# Patient Record
Sex: FEMALE | Race: BLACK OR AFRICAN AMERICAN | NOT HISPANIC OR LATINO | Employment: FULL TIME | URBAN - METROPOLITAN AREA
[De-identification: names, ages, dates, MRNs, and addresses within clinical notes are randomized per-mention and may not be internally consistent; named-entity substitution may affect disease eponyms.]

---

## 2024-04-02 ENCOUNTER — APPOINTMENT (OUTPATIENT)
Dept: GENERAL RADIOLOGY | Facility: HOSPITAL | Age: 44
End: 2024-04-02
Payer: COMMERCIAL

## 2024-04-02 ENCOUNTER — APPOINTMENT (OUTPATIENT)
Dept: CARDIOLOGY | Facility: HOSPITAL | Age: 44
End: 2024-04-02
Payer: COMMERCIAL

## 2024-04-02 ENCOUNTER — APPOINTMENT (OUTPATIENT)
Dept: CT IMAGING | Facility: HOSPITAL | Age: 44
End: 2024-04-02
Payer: COMMERCIAL

## 2024-04-02 ENCOUNTER — APPOINTMENT (OUTPATIENT)
Dept: MRI IMAGING | Facility: HOSPITAL | Age: 44
End: 2024-04-02
Payer: COMMERCIAL

## 2024-04-02 ENCOUNTER — HOSPITAL ENCOUNTER (OUTPATIENT)
Facility: HOSPITAL | Age: 44
Setting detail: OBSERVATION
Discharge: HOME HEALTH | End: 2024-04-04
Attending: EMERGENCY MEDICINE | Admitting: INTERNAL MEDICINE
Payer: COMMERCIAL

## 2024-04-02 DIAGNOSIS — G81.90 HEMIPLEGIA, UNSPECIFIED ETIOLOGY, UNSPECIFIED HEMIPLEGIA TYPE, UNSPECIFIED LATERALITY: Primary | ICD-10-CM

## 2024-04-02 DIAGNOSIS — R26.2 DIFFICULTY IN WALKING: ICD-10-CM

## 2024-04-02 DIAGNOSIS — Z78.9 DECREASED ACTIVITIES OF DAILY LIVING (ADL): ICD-10-CM

## 2024-04-02 DIAGNOSIS — R29.898 TRANSIENT WEAKNESS OF RIGHT LOWER EXTREMITY: ICD-10-CM

## 2024-04-02 DIAGNOSIS — R13.10 DYSPHAGIA, UNSPECIFIED TYPE: ICD-10-CM

## 2024-04-02 LAB
ABO GROUP BLD: NORMAL
ABO GROUP BLD: NORMAL
ALBUMIN SERPL-MCNC: 3.8 G/DL (ref 3.5–5.2)
ALBUMIN/GLOB SERPL: 1.2 G/DL
ALP SERPL-CCNC: 88 U/L (ref 39–117)
ALT SERPL W P-5'-P-CCNC: 11 U/L (ref 1–33)
ANION GAP SERPL CALCULATED.3IONS-SCNC: 10.3 MMOL/L (ref 5–15)
ANISOCYTOSIS BLD QL: NORMAL
APTT PPP: 27 SECONDS (ref 24.2–34.2)
AST SERPL-CCNC: 16 U/L (ref 1–32)
BACTERIA UR QL AUTO: ABNORMAL /HPF
BASOPHILS # BLD AUTO: 0.03 10*3/MM3 (ref 0–0.2)
BASOPHILS NFR BLD AUTO: 0.6 % (ref 0–1.5)
BILIRUB SERPL-MCNC: 0.2 MG/DL (ref 0–1.2)
BILIRUB UR QL STRIP: NEGATIVE
BLD GP AB SCN SERPL QL: NEGATIVE
BUN SERPL-MCNC: 10 MG/DL (ref 6–20)
BUN/CREAT SERPL: 10.2 (ref 7–25)
CALCIUM SPEC-SCNC: 8.5 MG/DL (ref 8.6–10.5)
CHLORIDE SERPL-SCNC: 104 MMOL/L (ref 98–107)
CLARITY UR: CLEAR
CO2 SERPL-SCNC: 23.7 MMOL/L (ref 22–29)
COLOR UR: YELLOW
CREAT SERPL-MCNC: 0.98 MG/DL (ref 0.57–1)
DACRYOCYTES BLD QL SMEAR: NORMAL
DEPRECATED RDW RBC AUTO: 42 FL (ref 37–54)
EGFRCR SERPLBLD CKD-EPI 2021: 72.7 ML/MIN/1.73
EOSINOPHIL # BLD AUTO: 0.11 10*3/MM3 (ref 0–0.4)
EOSINOPHIL NFR BLD AUTO: 2 % (ref 0.3–6.2)
ERYTHROCYTE [DISTWIDTH] IN BLOOD BY AUTOMATED COUNT: 17 % (ref 12.3–15.4)
GLOBULIN UR ELPH-MCNC: 3.3 GM/DL
GLUCOSE BLDC GLUCOMTR-MCNC: 78 MG/DL (ref 70–99)
GLUCOSE SERPL-MCNC: 100 MG/DL (ref 65–99)
GLUCOSE UR STRIP-MCNC: NEGATIVE MG/DL
HCT VFR BLD AUTO: 33.3 % (ref 34–46.6)
HGB BLD-MCNC: 10.1 G/DL (ref 12–15.9)
HGB UR QL STRIP.AUTO: ABNORMAL
HOLD SPECIMEN: NORMAL
HOLD SPECIMEN: NORMAL
HYALINE CASTS UR QL AUTO: ABNORMAL /LPF
HYPOCHROMIA BLD QL: NORMAL
IMM GRANULOCYTES # BLD AUTO: 0.02 10*3/MM3 (ref 0–0.05)
IMM GRANULOCYTES NFR BLD AUTO: 0.4 % (ref 0–0.5)
INR PPP: 1 (ref 0.86–1.15)
KETONES UR QL STRIP: NEGATIVE
LEUKOCYTE ESTERASE UR QL STRIP.AUTO: ABNORMAL
LYMPHOCYTES # BLD AUTO: 1.32 10*3/MM3 (ref 0.7–3.1)
LYMPHOCYTES NFR BLD AUTO: 24.4 % (ref 19.6–45.3)
MCH RBC QN AUTO: 21.2 PG (ref 26.6–33)
MCHC RBC AUTO-ENTMCNC: 30.3 G/DL (ref 31.5–35.7)
MCV RBC AUTO: 70 FL (ref 79–97)
MICROCYTES BLD QL: NORMAL
MONOCYTES # BLD AUTO: 0.56 10*3/MM3 (ref 0.1–0.9)
MONOCYTES NFR BLD AUTO: 10.4 % (ref 5–12)
NEUTROPHILS NFR BLD AUTO: 3.36 10*3/MM3 (ref 1.7–7)
NEUTROPHILS NFR BLD AUTO: 62.2 % (ref 42.7–76)
NITRITE UR QL STRIP: NEGATIVE
NRBC BLD AUTO-RTO: 0 /100 WBC (ref 0–0.2)
OVALOCYTES BLD QL SMEAR: NORMAL
PH UR STRIP.AUTO: 7.5 [PH] (ref 5–8)
PLATELET # BLD AUTO: 337 10*3/MM3 (ref 140–450)
PMV BLD AUTO: 10.7 FL (ref 6–12)
POIKILOCYTOSIS BLD QL SMEAR: NORMAL
POLYCHROMASIA BLD QL SMEAR: NORMAL
POTASSIUM SERPL-SCNC: 3.8 MMOL/L (ref 3.5–5.2)
PROT SERPL-MCNC: 7.1 G/DL (ref 6–8.5)
PROT UR QL STRIP: NEGATIVE
PROTHROMBIN TIME: 13.4 SECONDS (ref 11.8–14.9)
RBC # BLD AUTO: 4.76 10*6/MM3 (ref 3.77–5.28)
RBC # UR STRIP: ABNORMAL /HPF
REF LAB TEST METHOD: ABNORMAL
RH BLD: POSITIVE
RH BLD: POSITIVE
SMALL PLATELETS BLD QL SMEAR: ADEQUATE
SODIUM SERPL-SCNC: 138 MMOL/L (ref 136–145)
SP GR UR STRIP: 1.02 (ref 1–1.03)
SQUAMOUS #/AREA URNS HPF: ABNORMAL /HPF
T&S EXPIRATION DATE: NORMAL
TROPONIN T SERPL HS-MCNC: <6 NG/L
UROBILINOGEN UR QL STRIP: ABNORMAL
WBC # UR STRIP: ABNORMAL /HPF
WBC MORPH BLD: NORMAL
WBC NRBC COR # BLD AUTO: 5.4 10*3/MM3 (ref 3.4–10.8)
WHOLE BLOOD HOLD COAG: NORMAL
WHOLE BLOOD HOLD SPECIMEN: NORMAL

## 2024-04-02 PROCEDURE — 84484 ASSAY OF TROPONIN QUANT: CPT | Performed by: EMERGENCY MEDICINE

## 2024-04-02 PROCEDURE — 80053 COMPREHEN METABOLIC PANEL: CPT | Performed by: EMERGENCY MEDICINE

## 2024-04-02 PROCEDURE — 86850 RBC ANTIBODY SCREEN: CPT | Performed by: EMERGENCY MEDICINE

## 2024-04-02 PROCEDURE — 71045 X-RAY EXAM CHEST 1 VIEW: CPT

## 2024-04-02 PROCEDURE — 93010 ELECTROCARDIOGRAM REPORT: CPT | Performed by: INTERNAL MEDICINE

## 2024-04-02 PROCEDURE — 25510000001 IOPAMIDOL PER 1 ML: Performed by: EMERGENCY MEDICINE

## 2024-04-02 PROCEDURE — 96374 THER/PROPH/DIAG INJ IV PUSH: CPT

## 2024-04-02 PROCEDURE — G0378 HOSPITAL OBSERVATION PER HR: HCPCS

## 2024-04-02 PROCEDURE — 81001 URINALYSIS AUTO W/SCOPE: CPT | Performed by: EMERGENCY MEDICINE

## 2024-04-02 PROCEDURE — 70551 MRI BRAIN STEM W/O DYE: CPT

## 2024-04-02 PROCEDURE — 36415 COLL VENOUS BLD VENIPUNCTURE: CPT

## 2024-04-02 PROCEDURE — 99204 OFFICE O/P NEW MOD 45 MIN: CPT | Performed by: PSYCHIATRY & NEUROLOGY

## 2024-04-02 PROCEDURE — 86901 BLOOD TYPING SEROLOGIC RH(D): CPT

## 2024-04-02 PROCEDURE — 25010000002 KETOROLAC TROMETHAMINE PER 15 MG: Performed by: EMERGENCY MEDICINE

## 2024-04-02 PROCEDURE — 86900 BLOOD TYPING SEROLOGIC ABO: CPT

## 2024-04-02 PROCEDURE — 93005 ELECTROCARDIOGRAM TRACING: CPT | Performed by: EMERGENCY MEDICINE

## 2024-04-02 PROCEDURE — 25010000002 METOCLOPRAMIDE PER 10 MG: Performed by: EMERGENCY MEDICINE

## 2024-04-02 PROCEDURE — 99285 EMERGENCY DEPT VISIT HI MDM: CPT

## 2024-04-02 PROCEDURE — 85730 THROMBOPLASTIN TIME PARTIAL: CPT | Performed by: EMERGENCY MEDICINE

## 2024-04-02 PROCEDURE — 86900 BLOOD TYPING SEROLOGIC ABO: CPT | Performed by: EMERGENCY MEDICINE

## 2024-04-02 PROCEDURE — 70450 CT HEAD/BRAIN W/O DYE: CPT

## 2024-04-02 PROCEDURE — 70496 CT ANGIOGRAPHY HEAD: CPT

## 2024-04-02 PROCEDURE — 0042T HC CT CEREBRAL PERFUSION W/WO CONTRAST: CPT

## 2024-04-02 PROCEDURE — 25010000002 DIPHENHYDRAMINE PER 50 MG: Performed by: EMERGENCY MEDICINE

## 2024-04-02 PROCEDURE — 85025 COMPLETE CBC W/AUTO DIFF WBC: CPT | Performed by: EMERGENCY MEDICINE

## 2024-04-02 PROCEDURE — 93306 TTE W/DOPPLER COMPLETE: CPT | Performed by: INTERNAL MEDICINE

## 2024-04-02 PROCEDURE — 86901 BLOOD TYPING SEROLOGIC RH(D): CPT | Performed by: EMERGENCY MEDICINE

## 2024-04-02 PROCEDURE — 93306 TTE W/DOPPLER COMPLETE: CPT

## 2024-04-02 PROCEDURE — 82948 REAGENT STRIP/BLOOD GLUCOSE: CPT

## 2024-04-02 PROCEDURE — 85610 PROTHROMBIN TIME: CPT | Performed by: EMERGENCY MEDICINE

## 2024-04-02 PROCEDURE — 96375 TX/PRO/DX INJ NEW DRUG ADDON: CPT

## 2024-04-02 PROCEDURE — 25010000002 LORAZEPAM PER 2 MG: Performed by: STUDENT IN AN ORGANIZED HEALTH CARE EDUCATION/TRAINING PROGRAM

## 2024-04-02 PROCEDURE — 85007 BL SMEAR W/DIFF WBC COUNT: CPT | Performed by: EMERGENCY MEDICINE

## 2024-04-02 PROCEDURE — 70498 CT ANGIOGRAPHY NECK: CPT

## 2024-04-02 RX ORDER — SODIUM CHLORIDE 0.9 % (FLUSH) 0.9 %
10 SYRINGE (ML) INJECTION AS NEEDED
Status: DISCONTINUED | OUTPATIENT
Start: 2024-04-02 | End: 2024-04-04 | Stop reason: HOSPADM

## 2024-04-02 RX ORDER — ATORVASTATIN CALCIUM 40 MG/1
80 TABLET, FILM COATED ORAL NIGHTLY
Status: DISCONTINUED | OUTPATIENT
Start: 2024-04-02 | End: 2024-04-04 | Stop reason: HOSPADM

## 2024-04-02 RX ORDER — ACETAMINOPHEN 650 MG/1
650 SUPPOSITORY RECTAL EVERY 4 HOURS PRN
Status: DISCONTINUED | OUTPATIENT
Start: 2024-04-02 | End: 2024-04-04 | Stop reason: HOSPADM

## 2024-04-02 RX ORDER — ASPIRIN 81 MG/1
81 TABLET, CHEWABLE ORAL DAILY
Status: DISCONTINUED | OUTPATIENT
Start: 2024-04-02 | End: 2024-04-04 | Stop reason: HOSPADM

## 2024-04-02 RX ORDER — ONDANSETRON 2 MG/ML
4 INJECTION INTRAMUSCULAR; INTRAVENOUS EVERY 6 HOURS PRN
Status: DISCONTINUED | OUTPATIENT
Start: 2024-04-02 | End: 2024-04-04 | Stop reason: HOSPADM

## 2024-04-02 RX ORDER — SODIUM CHLORIDE 0.9 % (FLUSH) 0.9 %
10 SYRINGE (ML) INJECTION AS NEEDED
Status: DISCONTINUED | OUTPATIENT
Start: 2024-04-02 | End: 2024-04-03

## 2024-04-02 RX ORDER — METOCLOPRAMIDE HYDROCHLORIDE 5 MG/ML
10 INJECTION INTRAMUSCULAR; INTRAVENOUS ONCE
Status: COMPLETED | OUTPATIENT
Start: 2024-04-02 | End: 2024-04-02

## 2024-04-02 RX ORDER — LORAZEPAM 2 MG/ML
1 INJECTION INTRAMUSCULAR ONCE AS NEEDED
Status: COMPLETED | OUTPATIENT
Start: 2024-04-02 | End: 2024-04-02

## 2024-04-02 RX ORDER — SODIUM CHLORIDE 0.9 % (FLUSH) 0.9 %
10 SYRINGE (ML) INJECTION EVERY 12 HOURS SCHEDULED
Status: DISCONTINUED | OUTPATIENT
Start: 2024-04-02 | End: 2024-04-04 | Stop reason: HOSPADM

## 2024-04-02 RX ORDER — ASPIRIN 300 MG/1
300 SUPPOSITORY RECTAL DAILY
Status: DISCONTINUED | OUTPATIENT
Start: 2024-04-02 | End: 2024-04-04 | Stop reason: HOSPADM

## 2024-04-02 RX ORDER — SODIUM CHLORIDE 9 MG/ML
40 INJECTION, SOLUTION INTRAVENOUS AS NEEDED
Status: DISCONTINUED | OUTPATIENT
Start: 2024-04-02 | End: 2024-04-04 | Stop reason: HOSPADM

## 2024-04-02 RX ORDER — DIPHENHYDRAMINE HYDROCHLORIDE 50 MG/ML
25 INJECTION INTRAMUSCULAR; INTRAVENOUS ONCE
Status: COMPLETED | OUTPATIENT
Start: 2024-04-02 | End: 2024-04-02

## 2024-04-02 RX ORDER — ACETAMINOPHEN 325 MG/1
650 TABLET ORAL EVERY 4 HOURS PRN
Status: DISCONTINUED | OUTPATIENT
Start: 2024-04-02 | End: 2024-04-04 | Stop reason: HOSPADM

## 2024-04-02 RX ORDER — KETOROLAC TROMETHAMINE 30 MG/ML
30 INJECTION, SOLUTION INTRAMUSCULAR; INTRAVENOUS ONCE
Status: COMPLETED | OUTPATIENT
Start: 2024-04-02 | End: 2024-04-02

## 2024-04-02 RX ORDER — DIPHENOXYLATE HYDROCHLORIDE AND ATROPINE SULFATE 2.5; .025 MG/1; MG/1
1 TABLET ORAL DAILY
COMMUNITY

## 2024-04-02 RX ADMIN — LORAZEPAM 1 MG: 2 INJECTION INTRAMUSCULAR; INTRAVENOUS at 20:45

## 2024-04-02 RX ADMIN — Medication 10 ML: at 21:47

## 2024-04-02 RX ADMIN — KETOROLAC TROMETHAMINE 30 MG: 30 INJECTION, SOLUTION INTRAMUSCULAR; INTRAVENOUS at 12:22

## 2024-04-02 RX ADMIN — DIPHENHYDRAMINE HYDROCHLORIDE 25 MG: 50 INJECTION, SOLUTION INTRAMUSCULAR; INTRAVENOUS at 12:22

## 2024-04-02 RX ADMIN — ATORVASTATIN CALCIUM 80 MG: 40 TABLET, FILM COATED ORAL at 21:47

## 2024-04-02 RX ADMIN — METOCLOPRAMIDE HYDROCHLORIDE 10 MG: 5 INJECTION INTRAMUSCULAR; INTRAVENOUS at 12:22

## 2024-04-02 RX ADMIN — IOPAMIDOL 100 ML: 755 INJECTION, SOLUTION INTRAVENOUS at 09:35

## 2024-04-02 RX ADMIN — Medication 10 ML: at 13:45

## 2024-04-02 RX ADMIN — IOPAMIDOL 80 ML: 755 INJECTION, SOLUTION INTRAVENOUS at 09:24

## 2024-04-02 RX ADMIN — ASPIRIN 81 MG: 81 TABLET, CHEWABLE ORAL at 13:45

## 2024-04-02 NOTE — ED NOTES
Patient arrived via triage with reports of right sided weakness, numbness, tingling and right sided facial droop. Patient reports LKW 1030 yesterday night, and woke up at 0730 this AM with sx. Denies thinners.

## 2024-04-02 NOTE — CONSULTS
[facility]  [facility]      Patient Name:   Ro Rai  YOB: 1980  Identification Number:   MRN - [id number]  Date of Service:   04/02/2024 09:02:51    Diagnosis:        R51.9 - Headache, unspecified        R20.2 - Paresthesia of skin    Impression:       Pt with a h/o migraine, functional neurologic disorder, nonepileptic spells (PNES) presents with awakening with right sided weakness and numbness. Exam is fluctuating and slow, at times resisting the movements intended during the exam and nonrhythmic left sided shaking noted. Functional neurologic disorder suspected, however this is a diagnosis of exclusion.   Recommend MRI brain w/o, routine EEG for clarification. ASA 81 mg in interim.    Our recommendations are outlined below.    Recommendations:          Stroke/Telemetry Floor        Neuro Checks        Bedside Swallow Eval        DVT Prophylaxis        Euglycemia and Avoid Hyperthermia (PRN Acetaminophen)        Initiate or continue Aspirin 81 MG daily    Recommended Scan:       MRI Head Without Contrast   (CT head negative stroke still suspected)    Lipid Panel to Be Obtained, if Not Done in the Last 30 Days    Therapies:        Physical Therapy, Occupational Therapy, Speech Therapy Assessment When Applicable    Dysphagia:        Swallow Evaluation, Bedside        NPO Until Swallow Evaluation    DVT prophylaxis:        Choice of Primary Team    Disposition:        Neurology Follow Up Recommended    Sign Out:        Discussed with Emergency Department Provider        ------------------------------------------------------------------------------    Advanced Imaging:  CTA Head and Neck Completed.    CTP Completed.    LVO:No    Patient in not a candidate for VIRGEN      Metrics:  Last Known Well: 04/01/2024 22:30:00  [facility] Notification Time: 04/02/2024 09:01:52  Arrival Time: 04/02/2024 08:56:00  Stamp Time: 04/02/2024 09:02:51  Initial  Response Time: 04/02/2024 09:07:20  Symptoms: right sided weakness and numbness.  Initial patient interaction: 04/02/2024 09:09:17  NIHSS Assessment Completed: 04/02/2024 09:14:38  Patient is not a candidate for Thrombolytic.  Thrombolytic Medical Decision: 04/02/2024 09:14:41  Patient was not deemed candidate for Thrombolytic because of following reasons:  Last Well Known Above 4.5 Hours.    I personally Reviewed the CT Head and it Showed no acute process    Primary Provider Notified of Diagnostic Impression and Management Plan on: 04/02/2024 09:44:50        ------------------------------------------------------------------------------    History of Present Illness:  Patient is a 43 year old Female.    Patient was brought by private transportation with symptoms of right sided weakness and numbness.  The patient awoke with right sided weakness and numbness, she has a headache as well with photophobia and phonophobia. She has some nonrhythmic shaking of left upper extremity.  She denies recent stressors.  She has a h/o migraine, nonepileptic spells/conversion disorder manifesting as weakness and stuttering previously (per neurology records 3/12/2015)       Past Medical History:       There is no history of Diabetes Mellitus       There is no history of Atrial Fibrillation       There is no history of Stroke    Medications:    No Anticoagulant use   No Antiplatelet use  Reviewed EMR for current medications    Allergies:   Reviewed    Social History:  Smoking: No    Family History:    There is no family history of premature cerebrovascular disease pertinent to this consultation    ROS :  14 Points Review of Systems was performed and was negative except mentioned in HPI.    Past Surgical History:  There Is No Surgical History Contributory To Today’s Visit         Examination:  BP(158/81), Pulse(89), Blood Glucose(78)  1A: Level of Consciousness - Alert; keenly responsive + 0  1B: Ask Month and Age - Both Questions Right  + 0  1C: Blink Eyes & Squeeze Hands - Performs Both Tasks + 0  2: Test Horizontal Extraocular Movements - Normal + 0  3: Test Visual Fields - No Visual Loss + 0  4: Test Facial Palsy (Use Grimace if Obtunded) - Normal symmetry + 0  5A: Test Left Arm Motor Drift - No Drift for 10 Seconds + 0  5B: Test Right Arm Motor Drift - Drift, hits bed + 2  6A: Test Left Leg Motor Drift - No Drift for 5 Seconds + 0  6B: Test Right Leg Motor Drift - Drift, hits bed + 2  7: Test Limb Ataxia (FNF/Heel-Shin) - No Ataxia + 0  8: Test Sensation - Mild-Moderate Loss: Can Sense Being Touched + 1  9: Test Language/Aphasia - Normal; No aphasia + 0  10: Test Dysarthria - Normal + 0  11: Test Extinction/Inattention - No abnormality + 0    NIHSS Score: 5  NIHSS Free Text : slow, fluctuating exam, resisting some portions of exam activity   nonrhythmic left hand shaking intermittently    Pre-Morbid Modified [place] Scale:  0 Points = No symptoms at all    Spoke with : Dr. Duenas  I reviewed the available imaging via [facility] and initiated discussion with the primary provider    Patient/Family was informed the Neurology Consult would occur via TeleHealth consult by way of interactive audio and video telecommunications and consented to receiving care in this manner.      Patient is being evaluated for possible acute neurologic impairment and high probability of imminent or life-threatening deterioration. I spent total of 46 minutes providing care to this patient, including time for face to face visit via telemedicine, review of medical records, imaging studies and discussion of findings with providers, the patient and/or family.      Dr Vonnie Dawkins      [facility]  For Inpatient follow-up with [facility] physician please call [facility] [phone]. This is not an outpatient service. Post hospital discharge, please contact hospital directly.    Please do not communicate with [facility] physicians via secure chat. If you have any  questions, Please contact [facility].  Please call or reconsult our service if there are any clinical or diagnostic changes.

## 2024-04-02 NOTE — ED PROVIDER NOTES
Time: 10:49 AM EDT  Date of encounter:  4/2/2024  Independent Historian/Clinical History and Information was obtained by:   Patient and Family    History is limited by: N/A    Chief Complaint: Right-sided numbness and weakness      History of Present Illness:  Patient is a 43 y.o. year old female who presents to the emergency department for evaluation of right-sided numbness and weakness.  This patient was last known well at 1030 last night.  She presents today with right-sided numbness and weakness and also some difficulty speaking at times.    HPI    Patient Care Team  Primary Care Provider: Provider, Shruti Known    Past Medical History:     Allergies   Allergen Reactions    Sulfa Antibiotics Hives     History reviewed. No pertinent past medical history.  History reviewed. No pertinent surgical history.  History reviewed. No pertinent family history.    Home Medications:  Prior to Admission medications    Medication Sig Start Date End Date Taking? Authorizing Provider   COLLAGEN PO Take 1 tablet by mouth Daily.   Yes Carlos Osorio MD   CRANBERRY PO Take 1 tablet by mouth Daily.   Yes Carlos Osorio MD   Cyanocobalamin (B-12 PO) Take 1 tablet by mouth Daily.   Yes Carlos Osorio MD   multivitamin (MULTIVITAMIN PO) Take 1 tablet by mouth Daily.   Yes Carlos Osorio MD   multivitamin with minerals (HAIR SKIN & NAILS ADVANCED PO) Take 1 tablet by mouth Daily.   Yes Carlos Osorio MD   VITAMIN D PO Take 1 tablet by mouth Daily.   Yes Carlos Osorio MD        Social History:   Social History     Tobacco Use    Smoking status: Never   Vaping Use    Vaping status: Never Used   Substance Use Topics    Alcohol use: Yes     Alcohol/week: 1.0 standard drink of alcohol     Types: 1 Drinks containing 0.5 oz of alcohol per week    Drug use: Never         Review of Systems:  Review of Systems   Constitutional:  Negative for chills and fever.   HENT:  Negative for congestion, ear pain and  "sore throat.    Eyes:  Negative for pain.   Respiratory:  Negative for cough, chest tightness and shortness of breath.    Cardiovascular:  Negative for chest pain.   Gastrointestinal:  Negative for abdominal pain, diarrhea, nausea and vomiting.   Genitourinary:  Negative for flank pain and hematuria.   Musculoskeletal:  Negative for joint swelling.   Skin:  Negative for pallor.   Neurological:  Positive for speech difficulty, weakness, light-headedness and numbness. Negative for seizures and headaches.   All other systems reviewed and are negative.       Physical Exam:  /90 (BP Location: Right arm, Patient Position: Lying)   Pulse 59   Temp 98.6 °F (37 °C) (Oral)   Resp 16   Ht 165.1 cm (65\")   Wt 90.4 kg (199 lb 4.7 oz)   LMP  (LMP Unknown)   SpO2 100%   BMI 33.16 kg/m²     Physical Exam  Vitals and nursing note reviewed.   Constitutional:       General: She is not in acute distress.     Appearance: Normal appearance. She is not toxic-appearing.   HENT:      Head: Normocephalic and atraumatic.      Mouth/Throat:      Mouth: Mucous membranes are moist.   Eyes:      General: No scleral icterus.  Cardiovascular:      Rate and Rhythm: Normal rate and regular rhythm.      Pulses: Normal pulses.      Heart sounds: Normal heart sounds.   Pulmonary:      Effort: Pulmonary effort is normal. No respiratory distress.      Breath sounds: Normal breath sounds.   Abdominal:      General: Abdomen is flat.      Palpations: Abdomen is soft.      Tenderness: There is no abdominal tenderness.   Musculoskeletal:         General: Normal range of motion.      Cervical back: Normal range of motion and neck supple.   Skin:     General: Skin is warm and dry.      Capillary Refill: Capillary refill takes less than 2 seconds.   Neurological:      General: No focal deficit present.      Mental Status: She is alert and oriented to person, place, and time. Mental status is at baseline.      Sensory: Sensory deficit present.      " Motor: Weakness present.      Comments: NIH stroke score 5   Psychiatric:         Mood and Affect: Mood normal.         Behavior: Behavior normal.         Thought Content: Thought content normal.         Judgment: Judgment normal.                  Procedures:  Procedures      Medical Decision Making:      Comorbidities that affect care:    Seizure disorder    External Notes reviewed:    Previous ED Note: ED visit at [facility] in 2015 for possible seizure and abdominal pain      The following orders were placed and all results were independently analyzed by me:  Orders Placed This Encounter   Procedures    CT Head Without Contrast Stroke Protocol    CT Angiogram Head w AI Analysis of LVO    CT Angiogram Neck    CT CEREBRAL PERFUSION WITH & WITHOUT CONTRAST    XR Chest 1 View    MRI Brain Without Contrast    Aroda Draw    Comprehensive Metabolic Panel    Protime-INR    aPTT    Single High Sensitivity Troponin T    Urinalysis With Microscopic If Indicated (No Culture) - Urine, Clean Catch    CBC Auto Differential    Scan Slide    Urinalysis, Microscopic Only - Urine, Clean Catch    Hemoglobin A1c    Lipid Panel    Comprehensive Metabolic Panel    Diet: Cardiac; Healthy Heart (2-3 Na+); Fluid Consistency: Thin (IDDSI 0)    Initiate Department's Acute Stroke Process (Team D, Code 19, etc)    Perform NIH Stroke Scale    Measure Actual Weight    Head of Bed 30 Degrees or Less    Undress and Gown    Vital Signs    Neuro Checks    Notify Provider for SBP <80 or >200    Notify Provider for SBP >140 (For Hemorrhagic Stroke)    No Hypotonic Fluids    Nursing Dysphagia Screening (Complete Prior to Giving anything PO)    RN to Place Order SLP Consult (IF swallow screen failed) - Eval & Treat Choosing Reason of RN Dysphagia Screen Failed    Vital Signs    Continuous Pulse Oximetry    Telemetry - Place Orders & Notify Provider of Results When Patient Experiences Acute Chest Pain, Dysrhythmia or Respiratory Distress     Notify Provider    Intake and Output    Neuro Checks    NIHSS Assessment    Order CT Head Without Contrast for Neurological Decline    Saline Lock & Maintain IV Access    Place Sequential Compression Device    Maintain Sequential Compression Device    Activity As Tolerated    Code Status and Medical Interventions:    Hospitalist (on-call MD unless specified)    Inpatient Case Management  Consult    Inpatient Diabetes Educator Consult    Inpatient Neurology Consult Stroke    OT Consult: Eval & Treat    PT Consult: Eval & Treat As Tolerated    Oxygen Therapy- Nasal Cannula; Titrate 1-6 LPM Per SpO2; 90 - 95%    POC Glucose Once    POC Glucose Once    ECG 12 Lead ED Triage Standing Order; Acute Stroke (Onset <12 hrs)    Adult Transthoracic Echo Complete W/ Cont if Necessary Per Protocol (With Agitated Saline)    Type & Screen    ABO RH Specimen Verification    Insert Large Bore Peripheral IV - Right AC Preferred    Insert Peripheral IV    Initiate Observation Status    CBC & Differential    Green Top (Gel)    Lavender Top    Gold Top - SST    Light Blue Top       Medications Given in the Emergency Department:  Medications   sodium chloride 0.9 % flush 10 mL (has no administration in time range)   sodium chloride 0.9 % flush 10 mL (10 mL Intravenous Given 4/2/24 1345)   sodium chloride 0.9 % flush 10 mL (has no administration in time range)   sodium chloride 0.9 % infusion 40 mL (has no administration in time range)   atorvastatin (LIPITOR) tablet 80 mg (has no administration in time range)   aspirin chewable tablet 81 mg (81 mg Oral Given 4/2/24 1345)     Or   aspirin suppository 300 mg ( Rectal Not Given:  See Alt 4/2/24 1345)   ondansetron (ZOFRAN) injection 4 mg (has no administration in time range)   acetaminophen (TYLENOL) tablet 650 mg (has no administration in time range)     Or   acetaminophen (TYLENOL) suppository 650 mg (has no administration in time range)   iopamidol (ISOVUE-370) 76 %  injection 100 mL (80 mL Intravenous Given 4/2/24 0924)   iopamidol (ISOVUE-370) 76 % injection 100 mL (100 mL Intravenous Given 4/2/24 0935)   ketorolac (TORADOL) injection 30 mg (30 mg Intravenous Given 4/2/24 1222)   metoclopramide (REGLAN) injection 10 mg (10 mg Intravenous Given 4/2/24 1222)   diphenhydrAMINE (BENADRYL) injection 25 mg (25 mg Intravenous Given 4/2/24 1222)        ED Course:       EKG: Sinus bradycardia with a rate of 51 bpm  Cute ischemic changes are noted.        Labs:    Lab Results (last 24 hours)       Procedure Component Value Units Date/Time    POC Glucose Once [[id number]]  (Normal) Collected: 04/02/24 0902    Specimen: Blood Updated: 04/02/24 0906     Glucose 78 mg/dL      Comment: Serial Number: [id number]:  [id number]       CBC & Differential [[id number]]  (Abnormal) Collected: 04/02/24 0933    Specimen: Blood Updated: 04/02/24 1013    Narrative:      The following orders were created for panel order CBC & Differential.  Procedure                               Abnormality         Status                     ---------                               -----------         ------                     CBC Auto Differential[[id number]]        Abnormal            Final result               Scan Slide[[id number]]                                       Final result                 Please view results for these tests on the individual orders.    Comprehensive Metabolic Panel [[id number]]  (Abnormal) Collected: 04/02/24 0933    Specimen: Blood Updated: 04/02/24 0958     Glucose 100 mg/dL      BUN 10 mg/dL      Creatinine 0.98 mg/dL      Sodium 138 mmol/L      Potassium 3.8 mmol/L      Chloride 104 mmol/L      CO2 23.7 mmol/L      Calcium 8.5 mg/dL      Total Protein 7.1 g/dL      Albumin 3.8 g/dL      ALT (SGPT) 11 U/L      AST (SGOT) 16 U/L      Alkaline Phosphatase 88 U/L      Total Bilirubin 0.2 mg/dL      Globulin 3.3 gm/dL      A/G Ratio 1.2 g/dL      BUN/Creatinine Ratio 10.2     Anion Gap 10.3  mmol/L      eGFR 72.7 mL/min/1.73     Narrative:      GFR Normal >60  Chronic Kidney Disease <60  Kidney Failure <15      Protime-INR [[id number]]  (Normal) Collected: 04/02/24 0933    Specimen: Blood Updated: 04/02/24 0955     Protime 13.4 Seconds      INR 1.00    Narrative:      Suggested Therapeutic Ranges For Oral Anticoagulant Therapy:  Level of Therapy                      INR Target Range  Standard Dose                            2.0-3.0  High Dose                                2.5-3.5  Patients not receiving anticoagulant  Therapy Normal Range                     0.86-1.15    aPTT [[id number]]  (Normal) Collected: 04/02/24 0933    Specimen: Blood Updated: 04/02/24 0955     PTT 27.0 seconds     Single High Sensitivity Troponin T [[id number]]  (Normal) Collected: 04/02/24 0933    Specimen: Blood Updated: 04/02/24 0958     HS Troponin T <6 ng/L     Narrative:      High Sensitive Troponin T Reference Range:  <14.0 ng/L- Negative Female for AMI  <22.0 ng/L- Negative Male for AMI  >=14 - Abnormal Female indicating possible myocardial injury.  >=22 - Abnormal Male indicating possible myocardial injury.   Clinicians would have to utilize clinical acumen, EKG, Troponin, and serial changes to determine if it is an Acute Myocardial Infarction or myocardial injury due to an underlying chronic condition.         CBC Auto Differential [[id number]]  (Abnormal) Collected: 04/02/24 0933    Specimen: Blood Updated: 04/02/24 1013     WBC 5.40 10*3/mm3      RBC 4.76 10*6/mm3      Hemoglobin 10.1 g/dL      Hematocrit 33.3 %      MCV 70.0 fL      MCH 21.2 pg      MCHC 30.3 g/dL      RDW 17.0 %      RDW-SD 42.0 fl      MPV 10.7 fL      Platelets 337 10*3/mm3      Neutrophil % 62.2 %      Lymphocyte % 24.4 %      Monocyte % 10.4 %      Eosinophil % 2.0 %      Basophil % 0.6 %      Immature Grans % 0.4 %      Neutrophils, Absolute 3.36 10*3/mm3      Lymphocytes, Absolute 1.32 10*3/mm3      Monocytes, Absolute 0.56 10*3/mm3       Eosinophils, Absolute 0.11 10*3/mm3      Basophils, Absolute 0.03 10*3/mm3      Immature Grans, Absolute 0.02 10*3/mm3      nRBC 0.0 /100 WBC     Scan Slide [[id number]] Collected: 04/02/24 0933    Specimen: Blood Updated: 04/02/24 1013     Anisocytosis Slight/1+     Dacrocytes Slight/1+     Hypochromia Slight/1+     Microcytes Slight/1+     Ovalocytes Mod/2+     Poikilocytes Mod/2+     Polychromasia Slight/1+     WBC Morphology Normal     Platelet Estimate Adequate    Urinalysis With Microscopic If Indicated (No Culture) - Urine, Clean Catch [[id number]]  (Abnormal) Collected: 04/02/24 1022    Specimen: Urine, Clean Catch Updated: 04/02/24 1041     Color, UA Yellow     Appearance, UA Clear     pH, UA 7.5     Specific Gravity, UA 1.024     Glucose, UA Negative     Ketones, UA Negative     Bilirubin, UA Negative     Blood, UA Trace     Protein, UA Negative     Leuk Esterase, UA Large (3+)     Nitrite, UA Negative     Urobilinogen, UA 0.2 E.U./dL    Urinalysis, Microscopic Only - Urine, Clean Catch [[id number]]  (Abnormal) Collected: 04/02/24 1022    Specimen: Urine, Clean Catch Updated: 04/02/24 1041     RBC, UA 3-5 /HPF      WBC, UA 21-50 /HPF      Bacteria, UA None Seen /HPF      Squamous Epithelial Cells, UA 3-6 /HPF      Hyaline Casts, UA 0-2 /LPF      Methodology Automated Microscopy             Imaging:    CT Angiogram Head w AI Analysis of LVO, CT Angiogram Neck    Result Date: 4/2/2024  CT ANGIOGRAM NECK-, CT ANGIOGRAM HEAD W AI ANALYSIS OF LVO-  Date of Exam: 4/2/2024 9:21 AM  Indication: Neuro deficit, acute stroke suspected.  Comparison: CT head from earlier today  Technique: CTA of the head and neck was performed before and after the uneventful intravenous administration of 75 mL Isovue-370. Reconstructed coronal and sagittal images were also obtained. In addition, a 3-D volume rendered image was created for interpretation. Automated exposure control and iterative reconstruction methods were used.    Findings: There is a three-vessel arch. The right common carotid artery is widely patent. The right carotid bifurcation is widely patent. The right internal carotid artery is widely patent. The left common carotid artery is widely patent. The left carotid bifurcation is widely patent. The left internal carotid artery is widely patent. Both vertebral arteries are widely patent. The vertebral arteries are codominant.  The bilateral middle cerebral, bilateral anterior cerebral, and anterior communicating arteries are widely patent. The vertebral and bilateral posterior cerebral arteries are widely patent. There are patent bilateral posterior communicating arteries. No intracranial aneurysm is identified. The visualized portions of the bilateral superior cerebellar, anterior inferior cerebellar, and posterior inferior cerebellar arteries are unremarkable.      Impression: Major arterial vasculature within head and neck appears widely patent, with no hemodynamically significant stenosis, dissection, thrombosis, or aneurysm.    Electronically Signed By-Armando Segundo MD On:4/2/2024 11:20 AM      XR Chest 1 View    Result Date: 4/2/2024  XR CHEST 1 VW-  Date of Exam: 4/2/2024 9:45 AM  Indication: Acute cerebrovascular accident, left-sided weakness  Comparison: None available.  Findings: The heart size is normal. The pulmonary vascular markings are normal. The lungs and pleural spaces are clear of active disease. The bony thorax is normal for age.      Impression: No active disease.   Electronically Signed By-Barry Esquivel MD On:4/2/2024 10:07 AM      CT CEREBRAL PERFUSION WITH & WITHOUT CONTRAST    Result Date: 4/2/2024  EXAMINATION: CT CEREBRAL PERFUSION W WO CONTRAST-  DATE OF EXAM: 4/2/2024 9:20 AM  INDICATION: Neuro deficit, acute stroke suspected.  COMPARISON: CT same day  TECHNIQUE: Axial CT images of the brain were obtained prior to and after the administration of 80 mL Isovue-370. CT Perfusion protocol was  utilized. Automated post processing was performed by RAPID software and submitted to PACS for interpretation. Automated exposure control and iterative reconstruction was utilized.  FINDINGS: CT Perfusion: CBF (<30%) volume: 0 mL Tmax (>6.0s) volume: 0 mL Mismatch volume: 0 mL Mismatch ratio: None  The examination appears to be technically adequate. There is no reduced cerebral blood volume (CBV) or reduced cerebral blood flow (CBF) to suggest an area of acute infarction in a large vessel territory. The cerebral perfusion appears symmetric. No increased mean transit time (MTT) is seen. No areas of mismatch to suggest presence of a penumbra.      1. No focal area of decreased cerebral blood flow (CBF) is seen to suggest an acute infarct in a large vessel territory.  No defects are seen to suggest a core infarct or an area of reversible ischemia.     Electronically Signed By-Cody Mello On:4/2/2024 9:47 AM      CT Head Without Contrast Stroke Protocol    Result Date: 4/2/2024  CT HEAD WO CONTRAST STROKE PROTOCOL-  Date of Exam: 4/2/2024 9:05 AM  Indication: Neuro deficit, acute stroke suspected.  Comparison: None available.  Technique: Routine transaxial and coronal reconstruction images were obtained through the head without the administration of contrast. Automated exposure control and iterative reconstruction methods were used.   FINDINGS: Brain/Ventricles: There is some mild motion limitation. No definite acute intracranial hemorrhage noted. Within the right middle cranial fossa there is a 2.2 x 1.4 cm extra-axial calcification likely representing a calcified meningioma. The gray and white differentiation is maintained. Small areas of low-attenuation within the deep white matter and basal ganglia are nonspecific and may represent sequela small vessel ischemic change.  Orbits: The visualized portion of the orbits demonstrate no acute abnormality.  Sinuses:The visualized paranasal sinuses and mastoid air cells  demonstrate no acute abnormality.  Soft Tissues/Skull: No acute abnormality of the visualized skull or soft tissues.       1. No definite acute intracranial abnormality.  2. There is low-attenuation within the brain parenchyma are nonspecific and could represent small vessel ischemic change or other etiologies including demyelinating disease.  3. Probable meningioma right middle cranial fossa.  If clinically indicated MRI with contrast could always be considered on a nonemergent basis to further evaluate, unless clinically indicated sooner  Findings were discussed with Dr. Ladd at 9:18 a.m.   Electronically Signed By-Cody Mello On:4/2/2024 9:29 AM         Differential Diagnosis and Discussion:    Stroke: Differential diagnosis in this patient with signs of possible ischemic stroke include TIA or ischemic stroke, hemorrhagic stroke, hypoglycemic episode, toxic or metabolic encephalopathy, paresthesias.  Weakness: Based on the patient's history, signs, and symptoms, the diffential diagnosis includes but is not limited to meningitis, stroke, sepsis, subarachnoid hemorrhage, intracranial bleeding, encephalitis, acute uti, dehydration, MS, myasthenia gravis, Guillan [place], migraine variant, neuromuscular disorders vertigo, electrolyte imbalance, and metabolic disorders.    All labs were reviewed and interpreted by me.  EKG was interpreted by me.    MDM     Amount and/or Complexity of Data Reviewed  Clinical lab tests: reviewed  Tests in the radiology section of CPT®: reviewed  Tests in the medicine section of CPT®: reviewed                 Patient Care Considerations:    MRI: I considered ordering an MRI however this can be performed as an inpatient.      Consultants/Shared Management Plan:    Consultant: I have discussed the case with teleneurologist who states patient should be admitted for further workup with MRI.    Social Determinants of Health:    Patient is unable to carry out activities of daily life.  Escalation of care is necessary.       Disposition and Care Coordination:    Admit:   Through independent evaluation of the patient's history, physical, and imperical data, the patient meets criteria for inpatient admission to the hospital.        Final diagnoses:   Hemiplegia, unspecified etiology, unspecified hemiplegia type, unspecified laterality        ED Disposition       ED Disposition   Decision to Admit    Condition   --    Comment   Level of Care: Telemetry [5]   Diagnosis: Transient weakness of right lower extremity [[id number]]   Admitting Physician: SABINE JALLOH [[id number]]   Attending Physician: SABINE JALLOH [[id number]]                 This medical record created using voice recognition software.             Eddie Duenas,   04/02/24 1020

## 2024-04-02 NOTE — PLAN OF CARE
Goal Outcome Evaluation:                 Patient admit from ED for stroke workup, MRI pending. No acute changes this shift. NIH of 3

## 2024-04-02 NOTE — H&P
[facility] HOSPITALIST   HISTORY AND PHYSICAL    Provider, No Known    Subjective       CHIEF COMPLAINT:     Right sided weakness    HISTORY OF PRESENT ILLNESS:    42 yo female presented to the ER with right sided weakness. Last night was in normal state of health, but awoke this am with headache with photophobia and then noticed that her right arm and leg were more weak thus she came to the ER. There she had neuro w/u started. D/w ER physician, and tele-neuro rec admit for continued workup and decision was made to admit to OBS. At time of exam sx improved but endorses some LE worse the UE right weakness.       REVIEW OF SYSTEMS:  Please see the above history of present illness for pertinent positives and negatives.      Personal History       History reviewed. No pertinent past medical history.  History reviewed. No pertinent surgical history.  History reviewed. No pertinent family history.  Social History     Tobacco Use    Smoking status: Never   Vaping Use    Vaping status: Never Used   Substance Use Topics    Alcohol use: Yes     Alcohol/week: 1.0 standard drink of alcohol     Types: 1 Drinks containing 0.5 oz of alcohol per week    Drug use: Never     Medications Prior to Admission   Medication Sig Dispense Refill Last Dose    COLLAGEN PO Take 1 tablet by mouth Daily.   4/1/2024    CRANBERRY PO Take 1 tablet by mouth Daily.   4/1/2024    Cyanocobalamin (B-12 PO) Take 1 tablet by mouth Daily.   4/1/2024    multivitamin (MULTIVITAMIN PO) Take 1 tablet by mouth Daily.   4/1/2024    multivitamin with minerals (HAIR SKIN & NAILS ADVANCED PO) Take 1 tablet by mouth Daily.   4/1/2024    VITAMIN D PO Take 1 tablet by mouth Daily.   4/1/2024     Allergies:  Sulfa antibiotics      There is no immunization history on file for this patient.    Objective       Objective     Vital Signs  Temp:  [98.6 °F (37 °C)] 98.6 °F (37 °C)  Heart Rate:  [54-89] 54  Resp:  [20] 20  BP: (117-158)/(73-81)  "117/73    Flowsheet Rows      Flowsheet Row First Filed Value   Admission Height 165.1 cm (65\") Documented at 04/02/2024 1224   Admission Weight 92.8 kg (204 lb 9.4 oz) Documented at 04/02/2024 1224             Physical Exam:  Physical Exam  Vitals reviewed.   Cardiovascular:      Rate and Rhythm: Normal rate.   Pulmonary:      Effort: No respiratory distress.   Abdominal:      General: There is no distension.   Neurological:      Mental Status: She is alert.      Comments: Right  strength intact  Able to move RLE, gait not tested   Psychiatric:         Mood and Affect: Affect is flat.         Results Review:   I have reviewed these results below from the time of this admission to 4/2/2024 14:54 EDT :  [x]  Laboratory  []  Microbiology  [x]  Radiology  []  EKG/Telemetry   []  Cardiology/Vascular   []  Pathology  []  Old records  []  Other:  Most notable findings include: CXR personally reviewed with no active disease  EKG personally reviewed NSR        Assessment / Plan     Assessment & Plan     Active Hospital Problems:  Active Hospital Problems    Diagnosis     **Transient weakness of right lower extremity      Plan:      Transient right sided weakness  -poss atypical migraine, r/o stroke  -CT head 4/2: neg  -CTA head and neck 4/2: head and neck vasculature with no hemodynamically sig stenoses  -order echo  -MRI brain  -asa and statin  -PT/OT  -tele-neuro to follow    High risk   DVT ppx-SCDs      I discussed the patient's findings and my recommendations with patient and family member at baseline.     Electronically signed by Luis Solorzano DO, 04/02/24, 14:54 EDT.    Note disclaimer: At [facility], we believe that sharing information builds trust and better relationships. You are receiving this note because you recently visited [facility]. It is possible you will see health information before a provider has talked with you about it. This kind of information can be easy to misunderstand. To help you " fully understand what it means for your health, we urge you to discuss this note with your provider.

## 2024-04-02 NOTE — LETTER
April 4, 2024     Patient: Ro Kathleen   YOB: 1980   Date of Visit: 4/2/2024       To Whom It May Concern:    It is my medical opinion that Ro Kathleen has been treated at our hospital from 4/2 and discharged 4/4/24. She can return to work after Friday April 12.             Sincerely,          KERLINE Pollock Dr.

## 2024-04-03 ENCOUNTER — APPOINTMENT (OUTPATIENT)
Dept: NEUROLOGY | Facility: HOSPITAL | Age: 44
End: 2024-04-03
Payer: COMMERCIAL

## 2024-04-03 LAB
ALBUMIN SERPL-MCNC: 3.3 G/DL (ref 3.5–5.2)
ALBUMIN/GLOB SERPL: 1.2 G/DL
ALP SERPL-CCNC: 76 U/L (ref 39–117)
ALT SERPL W P-5'-P-CCNC: 9 U/L (ref 1–33)
ANION GAP SERPL CALCULATED.3IONS-SCNC: 9.9 MMOL/L (ref 5–15)
AST SERPL-CCNC: 13 U/L (ref 1–32)
BH CV ECHO MEAS - AO MAX PG: 9.7 MMHG
BH CV ECHO MEAS - AO MEAN PG: 5.4 MMHG
BH CV ECHO MEAS - AO ROOT DIAM: 2.9 CM
BH CV ECHO MEAS - AO V2 MAX: 156.1 CM/SEC
BH CV ECHO MEAS - AO V2 VTI: 33.1 CM
BH CV ECHO MEAS - AVA(I,D): 2.6 CM2
BH CV ECHO MEAS - EDV(CUBED): 96.4 ML
BH CV ECHO MEAS - EDV(MOD-SP2): 77.4 ML
BH CV ECHO MEAS - EDV(MOD-SP4): 78.8 ML
BH CV ECHO MEAS - EF(MOD-BP): 65.6 %
BH CV ECHO MEAS - EF(MOD-SP2): 61 %
BH CV ECHO MEAS - EF(MOD-SP4): 71.2 %
BH CV ECHO MEAS - ESV(CUBED): 19.4 ML
BH CV ECHO MEAS - ESV(MOD-SP2): 30.2 ML
BH CV ECHO MEAS - ESV(MOD-SP4): 22.7 ML
BH CV ECHO MEAS - FS: 41.4 %
BH CV ECHO MEAS - IVS/LVPW: 1.04 CM
BH CV ECHO MEAS - IVSD: 0.86 CM
BH CV ECHO MEAS - LA DIMENSION: 3.9 CM
BH CV ECHO MEAS - LAT PEAK E' VEL: 12.7 CM/SEC
BH CV ECHO MEAS - LV DIASTOLIC VOL/BSA (35-75): 39.6 CM2
BH CV ECHO MEAS - LV MASS(C)D: 125.8 GRAMS
BH CV ECHO MEAS - LV MAX PG: 7.7 MMHG
BH CV ECHO MEAS - LV MEAN PG: 3.9 MMHG
BH CV ECHO MEAS - LV SYSTOLIC VOL/BSA (12-30): 11.4 CM2
BH CV ECHO MEAS - LV V1 MAX: 139 CM/SEC
BH CV ECHO MEAS - LV V1 VTI: 29.2 CM
BH CV ECHO MEAS - LVIDD: 4.6 CM
BH CV ECHO MEAS - LVIDS: 2.7 CM
BH CV ECHO MEAS - LVOT AREA: 3 CM2
BH CV ECHO MEAS - LVOT DIAM: 1.94 CM
BH CV ECHO MEAS - LVPWD: 0.83 CM
BH CV ECHO MEAS - MED PEAK E' VEL: 11.4 CM/SEC
BH CV ECHO MEAS - MV A MAX VEL: 59.1 CM/SEC
BH CV ECHO MEAS - MV DEC SLOPE: 715 CM/SEC2
BH CV ECHO MEAS - MV DEC TIME: 0.16 SEC
BH CV ECHO MEAS - MV E MAX VEL: 111 CM/SEC
BH CV ECHO MEAS - MV E/A: 1.88
BH CV ECHO MEAS - PA V2 MAX: 115.4 CM/SEC
BH CV ECHO MEAS - RVDD: 3.8 CM
BH CV ECHO MEAS - SI(MOD-SP2): 23.7 ML/M2
BH CV ECHO MEAS - SI(MOD-SP4): 28.2 ML/M2
BH CV ECHO MEAS - SV(LVOT): 86.5 ML
BH CV ECHO MEAS - SV(MOD-SP2): 47.2 ML
BH CV ECHO MEAS - SV(MOD-SP4): 56.1 ML
BH CV ECHO MEAS - TAPSE (>1.6): 3 CM
BH CV ECHO MEAS - TR MAX PG: 10.4 MMHG
BH CV ECHO MEAS - TR MAX VEL: 161.5 CM/SEC
BH CV ECHO MEASUREMENTS AVERAGE E/E' RATIO: 9.21
BILIRUB SERPL-MCNC: 0.2 MG/DL (ref 0–1.2)
BUN SERPL-MCNC: 11 MG/DL (ref 6–20)
BUN/CREAT SERPL: 12.9 (ref 7–25)
CALCIUM SPEC-SCNC: 7.9 MG/DL (ref 8.6–10.5)
CHLORIDE SERPL-SCNC: 105 MMOL/L (ref 98–107)
CHOLEST SERPL-MCNC: 131 MG/DL (ref 0–200)
CO2 SERPL-SCNC: 24.1 MMOL/L (ref 22–29)
CREAT SERPL-MCNC: 0.85 MG/DL (ref 0.57–1)
EGFRCR SERPLBLD CKD-EPI 2021: 86.2 ML/MIN/1.73
GLOBULIN UR ELPH-MCNC: 2.8 GM/DL
GLUCOSE SERPL-MCNC: 93 MG/DL (ref 65–99)
HBA1C MFR BLD: 5 % (ref 4.8–5.6)
HDLC SERPL-MCNC: 49 MG/DL (ref 40–60)
LDLC SERPL CALC-MCNC: 74 MG/DL (ref 0–100)
LDLC/HDLC SERPL: 1.55 {RATIO}
LEFT ATRIUM VOLUME INDEX: 21.9 ML/M2
POTASSIUM SERPL-SCNC: 3.7 MMOL/L (ref 3.5–5.2)
PROT SERPL-MCNC: 6.1 G/DL (ref 6–8.5)
SODIUM SERPL-SCNC: 139 MMOL/L (ref 136–145)
TRIGL SERPL-MCNC: 30 MG/DL (ref 0–150)
VLDLC SERPL-MCNC: 8 MG/DL (ref 5–40)

## 2024-04-03 PROCEDURE — 96376 TX/PRO/DX INJ SAME DRUG ADON: CPT

## 2024-04-03 PROCEDURE — 25010000002 KETOROLAC TROMETHAMINE PER 15 MG: Performed by: INTERNAL MEDICINE

## 2024-04-03 PROCEDURE — 97165 OT EVAL LOW COMPLEX 30 MIN: CPT

## 2024-04-03 PROCEDURE — G0378 HOSPITAL OBSERVATION PER HR: HCPCS

## 2024-04-03 PROCEDURE — 83036 HEMOGLOBIN GLYCOSYLATED A1C: CPT | Performed by: INTERNAL MEDICINE

## 2024-04-03 PROCEDURE — 80053 COMPREHEN METABOLIC PANEL: CPT | Performed by: INTERNAL MEDICINE

## 2024-04-03 PROCEDURE — 80061 LIPID PANEL: CPT | Performed by: INTERNAL MEDICINE

## 2024-04-03 PROCEDURE — 99213 OFFICE O/P EST LOW 20 MIN: CPT | Performed by: PSYCHIATRY & NEUROLOGY

## 2024-04-03 PROCEDURE — 99232 SBSQ HOSP IP/OBS MODERATE 35: CPT | Performed by: INTERNAL MEDICINE

## 2024-04-03 PROCEDURE — 95816 EEG AWAKE AND DROWSY: CPT

## 2024-04-03 PROCEDURE — 92610 EVALUATE SWALLOWING FUNCTION: CPT

## 2024-04-03 RX ORDER — VENLAFAXINE HYDROCHLORIDE 37.5 MG/1
37.5 CAPSULE, EXTENDED RELEASE ORAL
Status: DISCONTINUED | OUTPATIENT
Start: 2024-04-03 | End: 2024-04-04 | Stop reason: HOSPADM

## 2024-04-03 RX ORDER — KETOROLAC TROMETHAMINE 30 MG/ML
30 INJECTION, SOLUTION INTRAMUSCULAR; INTRAVENOUS EVERY 6 HOURS PRN
Status: DISCONTINUED | OUTPATIENT
Start: 2024-04-03 | End: 2024-04-04 | Stop reason: HOSPADM

## 2024-04-03 RX ADMIN — ACETAMINOPHEN 650 MG: 325 TABLET ORAL at 18:24

## 2024-04-03 RX ADMIN — Medication 10 ML: at 20:19

## 2024-04-03 RX ADMIN — ACETAMINOPHEN 650 MG: 325 TABLET ORAL at 13:50

## 2024-04-03 RX ADMIN — VENLAFAXINE HYDROCHLORIDE 37.5 MG: 37.5 CAPSULE, EXTENDED RELEASE ORAL at 11:12

## 2024-04-03 RX ADMIN — KETOROLAC TROMETHAMINE 30 MG: 30 INJECTION, SOLUTION INTRAMUSCULAR; INTRAVENOUS at 20:19

## 2024-04-03 RX ADMIN — ASPIRIN 81 MG: 81 TABLET, CHEWABLE ORAL at 09:10

## 2024-04-03 RX ADMIN — ACETAMINOPHEN 650 MG: 325 TABLET ORAL at 09:10

## 2024-04-03 RX ADMIN — Medication 10 ML: at 09:10

## 2024-04-03 NOTE — CONSULTS
Consult received per Stroke Protocol. Patient without a noted DM diagnosis, with a current HbA1c of 5.0%, and an estimated average glucose of 97 mg/dL. Blood glucose values have remained WDL, with a low of 78 mg/dL, and a high of 100 mg/dL.

## 2024-04-03 NOTE — SIGNIFICANT NOTE
04/03/24 1215   Coping/Psychosocial   Observed Emotional State calm;cooperative   Verbalized Emotional State relief;hopefulness   Trust Relationship/Rapport empathic listening provided   Involvement in Care interacting with patient   Additional Documentation Spiritual Care (Group)   Spiritual Care   Use of Spiritual Resources non-Sikhism use of spiritual care   Spiritual Care Source  initiative   Spiritual Care Follow-Up follow-up, none required as presently assessed   Response to Spiritual Care receptive of support;engaged in conversation   Spiritual Care Interventions supportive conversation provided   Spiritual Care Visit Type initial   Receptivity to Spiritual Care visit welcomed

## 2024-04-03 NOTE — PLAN OF CARE
Goal Outcome Evaluation:  Plan of Care Reviewed With: (P) patient        Progress: (P) no change  Outcome Evaluation: (P) pt presents to therapy with significant RLE weakness, impairing ability to ambulate and transfer safely. Pt requires skilled PT to improve safety and independence with mobility.      Anticipated Discharge Disposition (PT): (P) inpatient rehabilitation facility

## 2024-04-03 NOTE — PLAN OF CARE
Goal Outcome Evaluation:  Plan of Care Reviewed With: patient           Outcome Evaluation: Patient has experienced decline in function from baseline status, presenting w/ deficits related to safety awareness, balance, strength, transfers and mobility that impede patient independence with activities of daily living.  Patient would benefit from skilled Occupational Therapy intervention to maxamize patient safety, and promote return to baseline independence.      Anticipated Discharge Disposition (OT): inpatient rehabilitation facility

## 2024-04-03 NOTE — PLAN OF CARE
Goal Outcome Evaluation:                 Prn Tylenol given x2 this shift for c/o headache. No acute changes.

## 2024-04-03 NOTE — THERAPY EVALUATION
Acute Care - Physical Therapy Initial Evaluation  [facility]     Patient Name: Ro Kathleen  : 1980  MRN: [id number]  Today's Date: 4/3/2024 Admit date: 2024     Referring Physician: Luis Solorzano DO     Surgery Date:* No surgery found *           Visit Dx:     ICD-10-CM ICD-9-CM   1. Hemiplegia, unspecified etiology, unspecified hemiplegia type, unspecified laterality  G81.90 342.90   2. Dysphagia, unspecified type  R13.10 787.20   3. Decreased activities of daily living (ADL)  Z78.9 V49.89   4. Difficulty in walking  R26.2 719.7     Patient Active Problem List   Diagnosis    Transient weakness of right lower extremity     History reviewed. No pertinent past medical history.  History reviewed. No pertinent surgical history.  PT Assessment (Last 12 Hours)       PT Evaluation and Treatment       Row Name 24 1024          Physical Therapy Time and Intention    Subjective Information no complaints (P)   -NM     Document Type evaluation (P)   -NM     Mode of Treatment individual therapy;physical therapy (P)   -NM     Patient Effort good (P)   -NM     Symptoms Noted During/After Treatment fatigue (P)   -NM       Row Name 24 1024          General Information    Patient Profile Reviewed yes (P)   -NM     Patient Observations alert;cooperative;agree to therapy (P)   -NM     Prior Level of Function independent: (P)   -NM     Equipment Currently Used at Home none (P)   -NM     Existing Precautions/Restrictions no known precautions/restrictions (P)   -NM     Barriers to Rehab none identified (P)   -NM       Row Name 24 1024          Living Environment    Current Living Arrangements home (P)   -NM     People in Home significant other (P)   -NM     Primary Care Provided by self (P)   -NM       Row Name 24 1024          Home Use of Assistive/Adaptive Equipment    Equipment Currently Used at Home none (P)   -NM       Row Name 24 1024          Pain    Pretreatment Pain Rating 0/10 - no  pain (P)   -NM     Posttreatment Pain Rating 0/10 - no pain (P)   -NM       Row Name 04/03/24 1024          Range of Motion (ROM)    Range of Motion bilateral lower extremities;ROM is WFL (P)   -NM       Row Name 04/03/24 1024          Strength (Manual Muscle Testing)    Strength (Manual Muscle Testing) right lower extremity strength (P)   -NM     Right Lower Extremity Strength hip;knee;ankle (P)   -NM     Hip, Right (Strength) 4-/5 (P)   -NM       Row Name 04/03/24 1024          Bed Mobility    Bed Mobility supine-sit;sit-supine (P)   -NM     Supine-Sit Arma (Bed Mobility) standby assist (P)   -NM     Sit-Supine Arma (Bed Mobility) standby assist (P)   -NM       Row Name 04/03/24 1024          Transfers    Transfers sit-stand transfer;stand-sit transfer (P)   -NM       Row Name 04/03/24 1024          Sit-Stand Transfer    Sit-Stand Arma (Transfers) contact guard (P)   -NM     Assistive Device (Sit-Stand Transfers) walker, front-wheeled (P)   -NM       Row Name 04/03/24 1024          Stand-Sit Transfer    Stand-Sit Arma (Transfers) contact guard (P)   -NM     Assistive Device (Stand-Sit Transfers) walker, front-wheeled (P)   -NM       Row Name 04/03/24 1024          Gait/Stairs (Locomotion)    Gait/Stairs Locomotion gait/ambulation assistive device (P)   -NM     Arma Level (Gait) minimum assist (75% patient effort) (P)   -NM     Assistive Device (Gait) walker, front-wheeled (P)   -NM     Patient was able to Ambulate yes (P)   -NM     Distance in Feet (Gait) 15 (P)   -NM     Pattern (Gait) step-through;step-to (P)   -NM     Deviations/Abnormal Patterns (Gait) bilateral deviations;base of support, narrow;raul decreased;festinating/shuffling;gait speed decreased;scissoring;stride length decreased;weight shifting decreased (P)   -NM       Row Name 04/03/24 1024          Safety Issues, Functional Mobility    Impairments Affecting Function (Mobility) balance;endurance/activity  tolerance;postural/trunk control;strength (P)   -NM       Row Name 04/03/24 1024          Balance    Balance Assessment standing dynamic balance (P)   -NM     Dynamic Standing Balance minimal assist (P)   -NM     Position/Device Used, Standing Balance walker, front-wheeled (P)   -NM       Row Name 04/03/24 1024          Plan of Care Review    Plan of Care Reviewed With patient (P)   -NM     Progress no change (P)   -NM     Outcome Evaluation pt presents to therapy with significant RLE weakness, impairing ability to ambulate and transfer safely. Pt requires skilled PT to improve safety and independence with mobility. (P)   -NM       Row Name 04/03/24 1024          Positioning and Restraints    Pre-Treatment Position in bed (P)   -NM     Post Treatment Position bed (P)   -NM     In Bed supine;call light within reach;encouraged to call for assist;exit alarm on (P)   -NM       Row Name 04/03/24 1024          Therapy Assessment/Plan (PT)    Patient/Family Therapy Goals Statement (PT) pt wants to ambulate independently again (P)   -NM     Rehab Potential (PT) good, to achieve stated therapy goals (P)   -NM     Criteria for Skilled Interventions Met (PT) meets criteria (P)   -NM     Therapy Frequency (PT) daily (P)   -NM     Predicted Duration of Therapy Intervention (PT) 10 days (P)   -NM     Problem List (PT) problems related to;balance;mobility;strength (P)   -NM     Activity Limitations Related to Problem List (PT) unable to ambulate safely;unable to transfer safely (P)   -NM       Row Name 04/03/24 1024          PT Evaluation Complexity    History, PT Evaluation Complexity 3 or more personal factors and/or comorbidities (P)   -NM     Examination of Body Systems (PT Eval Complexity) total of 4 or more elements (P)   -NM     Clinical Presentation (PT Evaluation Complexity) stable (P)   -NM     Clinical Decision Making (PT Evaluation Complexity) low complexity (P)   -NM     Overall Complexity (PT Evaluation Complexity)  low complexity (P)   -NM       Row Name 04/03/24 1024          Therapy Plan Review/Discharge Plan (PT)    Therapy Plan Review (PT) evaluation/treatment results reviewed;care plan/treatment goals reviewed (P)   -NM       Row Name 04/03/24 1024          Physical Therapy Goals    Transfer Goal Selection (PT) transfer, PT goal 1 (P)   -NM     Gait Training Goal Selection (PT) gait training, PT goal 1 (P)   -NM       Row Name 04/03/24 1024          Transfer Goal 1 (PT)    Activity/Assistive Device (Transfer Goal 1, PT) transfers, all (P)   -NM     [place] Level/Cues Needed (Transfer Goal 1, PT) independent (P)   -NM     Time Frame (Transfer Goal 1, PT) 10 days (P)   -NM       Row Name 04/03/24 1024          Gait Training Goal 1 (PT)    Activity/Assistive Device (Gait Training Goal 1, PT) gait (walking locomotion) (P)   -NM     [place] Level (Gait Training Goal 1, PT) independent (P)   -NM     Distance (Gait Training Goal 1, PT) 150ft (P)   -NM     Time Frame (Gait Training Goal 1, PT) 10 days (P)   -NM               User Key  (r) = Recorded By, (t) = Taken By, (c) = Cosigned By      Initials Name Provider Type    NM Arash Black, PT Student PT Student                    Physical Therapy Education       Title: PT OT SLP Therapies (Done)       Topic: Physical Therapy (Done)       Point: Mobility training (Done)       Learning Progress Summary             Patient Acceptance, E,TB, VU by NM at 4/3/2024 1034                                         User Key       Initials Effective Dates Name Provider Type Discipline    NM 01/31/24 -  Arash Black, PT Student PT Student PT                  PT Recommendation and Plan  Anticipated Discharge Disposition (PT): (P) inpatient rehabilitation facility  Planned Therapy Interventions (PT): (P) balance training, gait training, transfer training, strengthening  Therapy Frequency (PT): (P) daily  Plan of Care Reviewed With: (P) patient  Progress: (P) no  change  Outcome Evaluation: (P) pt presents to therapy with significant RLE weakness, impairing ability to ambulate and transfer safely. Pt requires skilled PT to improve safety and independence with mobility.   Outcome Measures       Row Name 04/03/24 1000             How much help from another person do you currently need...    Turning from your back to your side while in flat bed without using bedrails? 4 (P)   -NM      Moving from lying on back to sitting on the side of a flat bed without bedrails? 4 (P)   -NM      Moving to and from a bed to a chair (including a wheelchair)? 2 (P)   -NM      Standing up from a chair using your arms (e.g., wheelchair, bedside chair)? 3 (P)   -NM      Climbing 3-5 steps with a railing? 1 (P)   -NM      To walk in hospital room? 2 (P)   -NM      AM-PAC 6 Clicks Score (PT) 16 (P)   -NM      Highest Level of Mobility Goal 5 --> Static standing (P)   -NM                User Key  (r) = Recorded By, (t) = Taken By, (c) = Cosigned By      Initials Name Provider Type    Arash Bennett, PT Student PT Student                     Time Calculation:    PT Charges       Row Name 04/03/24 1024             Time Calculation    PT Received On 04/03/24 (P)   -NM      PT Goal Re-Cert Due Date 04/12/24 (P)   -NM         Untimed Charges    PT Eval/Re-eval Minutes 28 (P)   -NM         Total Minutes    Untimed Charges Total Minutes 28 (P)   -NM       Total Minutes 28 (P)   -NM                User Key  (r) = Recorded By, (t) = Taken By, (c) = Cosigned By      Initials Name Provider Type    Arash Bennett, PT Student PT Student                      PT G-Codes  AM-PAC 6 Clicks Score (PT): (P) 16    Arash Black PT Student  4/3/2024

## 2024-04-03 NOTE — THERAPY EVALUATION
Acute Care - Speech Language Pathology   Swallow Initial Evaluation  [place]     Patient Name: Ro Kathleen  : 1980  MRN: [id number]  Today's Date: 4/3/2024               Admit Date: 2024    Visit Dx:     ICD-10-CM ICD-9-CM   1. Hemiplegia, unspecified etiology, unspecified hemiplegia type, unspecified laterality  G81.90 342.90   2. Dysphagia, unspecified type  R13.10 787.20     Patient Active Problem List   Diagnosis    Transient weakness of right lower extremity     History reviewed. No pertinent past medical history.  History reviewed. No pertinent surgical history.    SLP Recommendation and Plan  SLP Swallowing Diagnosis: swallow WFL/no suspected pharyngeal impairment (24)  SLP Diet Recommendation: regular textures, thin liquids (24)  Recommended Precautions and Strategies: upright posture during/after eating (24)  SLP Rec. for Method of Medication Administration: meds whole (24)     Monitor for Signs of Aspiration: notify SLP if any concerns (24)     Swallow Criteria for Skilled Therapeutic Interventions Met: no problems identified which require skilled intervention (24)  Anticipated Discharge Disposition (SLP): No further SLP services warranted (24)     Therapy Frequency (Swallow): evaluation only (24)                                                     SWALLOW EVALUATION (Last 72 Hours)       SLP Adult Swallow Evaluation       Row Name 24                   Rehab Evaluation    Document Type evaluation  -SN        Subjective Information complains of;weakness  -SN        Patient Observations alert;cooperative  -SN        Patient Effort good  -SN        Symptoms Noted During/After Treatment none  -SN           General Information    Current Method of Nutrition regular textures;thin liquids  -SN        Prior Level of Function-Swallowing no diet consistency restrictions;safe, efficient swallowing in  all situations  -SN        Plans/Goals Discussed with patient  -SN        Barriers to Rehab none identified  -SN        Patient's Goals for Discharge return home  -SN           Oral Motor Structure and Function    Dentition Assessment natural, present and adequate  -SN        Secretion Management WNL/WFL  -SN        Mucosal Quality moist, healthy  -SN        Gag Response WFL  -SN        Volitional Swallow WFL  -SN        Volitional Cough WFL  -SN           Oral Musculature and Cranial Nerve Assessment    Oral Motor General Assessment WFL  -SN           General Eating/Swallowing Observations    Respiratory Support Currently in Use room air  -SN        Eating/Swallowing Skills self-fed  -SN        Positioning During Eating upright 90 degree;upright in bed  -SN        Utensils Used spoon;cup;straw  -SN        Consistencies Trialed regular textures;thin liquids  -SN           Clinical Swallow Eval    Oral Prep Phase WFL  -SN        Oral Transit WFL  -SN        Oral Residue WFL  -SN        Pharyngeal Phase no overt signs/symptoms of pharyngeal impairment  -SN        Esophageal Phase unremarkable  -SN        Clinical Swallow Evaluation Summary Patient is a 43-year-old female admitted to [facility] on 4/2/2024 secondary to transient weakness of right lower extremity.  Patient reports sudden onset of right-sided weakness as well as right-sided facial numbness/droop.  Oral motor examination this a.m. essentially within normal limits.  Did not appreciate any facial droop or lingual deviation.  Patient swallowing consistencies at bedside without overt signs or symptoms of aspiration.  Adequate chewing/mastication of regular solids.  Silent aspiration cannot be ruled out at bedside.  -SN           SLP Evaluation Clinical Impression    SLP Swallowing Diagnosis swallow WFL/no suspected pharyngeal impairment  -SN        Swallow Criteria for Skilled Therapeutic Interventions Met no problems identified which require  skilled intervention  -SN           Recommendations    Therapy Frequency (Swallow) evaluation only  -SN        SLP Diet Recommendation regular textures;thin liquids  -SN        Recommended Precautions and Strategies upright posture during/after eating  -SN        SLP Rec. for Method of Medication Administration meds whole  -SN        Monitor for Signs of Aspiration notify SLP if any concerns  -SN                  User Key  (r) = Recorded By, (t) = Taken By, (c) = Cosigned By      Initials Name Effective Dates    Milagro Cerda MS-CCC/SLP, CARYL 03/31/21 -                 Patient exhibits level 7 of 7 on functional communication measures for swallowing, indicating a 0% limitation in function.  At this time patient does not require any further skilled speech pathology services regarding dysphagia.  If she should demonstrate a decline in status please reconsult speech pathology.    EDUCATION  The patient has been educated in the following areas:   Dysphagia (Swallowing Impairment).              Time Calculation:    Time Calculation- SLP       Row Name 04/03/24 0956             Time Calculation- SLP    SLP Start Time 0730  -SN      SLP Stop Time 0830  -SN      SLP Time Calculation (min) 60 min  -SN      SLP Received On 04/03/24  -SN         Untimed Charges    24087-XS Eval Oral Pharyng Swallow Minutes 60  -SN         Total Minutes    Untimed Charges Total Minutes 60  -SN       Total Minutes 60  -SN                User Key  (r) = Recorded By, (t) = Taken By, (c) = Cosigned By      Initials Name Provider Type    SN Milagro Cramer MS-CCC/SLP, CARYL Speech and Language Pathologist                    Therapy Charges for Today       Code Description Service Date Service Provider Modifiers Qty    [id number] HC ST EVAL ORAL PHARYNG SWALLOW 4 4/3/2024 Milagro Cramer MS-CCC/SLP, CARYL GN 1                 TARA Gonzalez/SLP, CARYL  4/3/2024

## 2024-04-03 NOTE — PROGRESS NOTES
"Hospital Medicine Team    LOS 0 days      Patient Care Team:  Provider, No Known as PCP - Emerita Brown MD as Consulting Physician (Obstetrics and Gynecology)      Subjective       Chief Complaint:  f/u right sided weakness    Subjective    Still with mild headache today. Reports her weakness improved and she was able to ambulate to restroom but does report feeling like RLE \"dragging\"    Objective       Vital Signs  Temp:  [97.3 °F (36.3 °C)-99 °F (37.2 °C)] 99 °F (37.2 °C)  Heart Rate:  [59-71] 66  Resp:  [16-18] 18  BP: (117-133)/(70-90) 133/86  Oxygen Therapy  SpO2: 94 %  Pulse Oximetry Type: Continuous  Device (Oxygen Therapy): room air  Device (Oxygen Therapy) (Infant): room air  Flowsheet Rows      Flowsheet Row First Filed Value   Admission Height 165.1 cm (65\") Documented at 04/02/2024 1224   Admission Weight 92.8 kg (204 lb 9.4 oz) Documented at 04/02/2024 1224                Physical Exam:  Physical Exam  Vitals reviewed.   Cardiovascular:      Rate and Rhythm: Normal rate.   Abdominal:      General: There is no distension.   Musculoskeletal:      Right lower leg: No edema.      Left lower leg: No edema.   Neurological:      Mental Status: She is alert.           Results Review:    I reviewed the patient's new clinical results.    [x]  Laboratory  []  Microbiology  [x]  Radiology  []  EKG/Telemetry   []  Cardiology/Vascular   []  Pathology  []  Old records  []  Other:      X-rays, labs reviewed personally by physician.    Medication Review:   I have reviewed the patient's current medication list    Scheduled Meds  aspirin, 81 mg, Oral, Daily   Or  aspirin, 300 mg, Rectal, Daily  atorvastatin, 80 mg, Oral, Nightly  sodium chloride, 10 mL, Intravenous, Q12H  venlafaxine XR, 37.5 mg, Oral, Daily With Breakfast        Meds Infusions       Meds PRN    acetaminophen **OR** acetaminophen    ondansetron    sodium chloride    sodium chloride        Assessment / Plan       Active Hospital " Problems:  Active Hospital Problems    Diagnosis  POA    **Transient weakness of right lower extremity [R29.898]  Yes      Resolved Hospital Problems   No resolved problems to display.        Transient right sided weakness  H/o PNES   -likely atypical migraine, stroke ruled out  -CT head 4/2: neg  -CTA head and neck 4/2: head and neck vasculature with no hemodynamically sig stenoses  -Echo 4/3: EF 65%  -MRI brain 4/2: no acute or subacute infarct or hemorrhage  -EEG pending  -asa and statin  -PT/OT: rec STR? Although pt reported ability for more ambulation today  -tele-neuro following:       DVT ppx-SCDs      Plan for disposition:pending EEG, will also d/w MSW     Electronically signed by Luis Solorzano DO, 04/03/24, 14:19 EDT.      Note disclaimer: At [facility], we believe that sharing information builds trust and better relationships. You are receiving this note because you recently visited [facility]. It is possible you will see health information before a provider has talked with you about it. This kind of information can be easy to misunderstand. To help you fully understand what it means for your health, we urge you to discuss this note with your provider.

## 2024-04-03 NOTE — PROGRESS NOTES
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 Initial neuro exam noted as fluctuating and slow, at times resisting the movements intended during the exam and nonrhythmic left sided shaking noted c/f functional neurologic disorder. MRI Brain negative for acute findings. Recommend:    Routine EEG  For migraine prevention, discussed risks and benefit of Effexor 37.5 mg daily which patient is agreeable to. Would monitor BP with this  PT/OT  Outpatient f/up with neuro in 4-6 weeks  If EEG negative, no further neurologic work up  Neurology will follow    Our recommendations are outlined below    Diagnostic Studies :  Routine EEG    Consultations :  Physical therapy/Occupational therapy    Dispositions :  Neurology will followOutpatient Neurology follow up in 3-6 weeks    Subjective  No acute events overnight. She still feels right leg numb and weak, feels like leg is dragging.    Hospital Course  43 year old Female.    Patient was brought by private transportation with symptoms of right sided weakness and numbness.  The patient awoke with right sided weakness and numbness, she has a headache as well with photophobia and phonophobia. She has some nonrhythmic shaking of left upper extremity.  She denies recent stressors.  She has a h/o migraine, nonepileptic spells/conversion disorder manifesting as weakness and stuttering previously (per neurology records 3/12/2015)    HA occur up to 3 times/month, tend to be severe. Was previously on TPM but caused brain fog.  Will also get lower grade headaches about once every week.    Imaging  MRI brain w/o cont:  1. No evidence of acute or subacute infarct. No acute hemorrhage.  2. Normal homogeneous appearance of the white matter.  3. Densely calcified mass in the right middle cranial fossa is likely a  calcified meningioma.    CTA head/neckL  Major arterial vasculature within head and neck appears widely patent,  with no hemodynamically significant stenosis, dissection, thrombosis, or  aneurysm.      Examination  BP(121/84), Pulse(66), Temp(98.1), Resp(16),  1A: Level of Consciousness - Alert; keenly responsive + 0  1B: Ask Month and Age - Both Questions Right + 0  1C: Blink Eyes & Squeeze Hands - Performs Both Tasks + 0  2: Test Horizontal Extraocular Movements - Normal + 0  3: Test Visual Fields - No Visual Loss + 0  4: Test Facial Palsy (Use Grimace if Obtunded) - Normal symmetry + 0  5A: Test Left Arm Motor Drift - No Drift for 10 Seconds + 0  5B: Test Right Arm Motor Drift - No Drift for 10 Seconds + 0  6A: Test Left Leg Motor Drift - No Drift for 5 Seconds + 0  6B: Test Right Leg Motor Drift - No Drift for 5 Seconds + 0  7: Test Limb Ataxia (FNF/Heel-Shin) - No Ataxia + 0  8: Test Sensation - Mild-Moderate Loss: Can Sense Being Touched + 1  9: Test Language/Aphasia - Normal; No aphasia + 0  10: Test Dysarthria - Normal + 0  11: Test Extinction/Inattention - No abnormality + 0    NIHSS Score: 1  NIHSS Free Text : AAOx3  Tingling in right forearm, tickles when touch right leg  Able to lift right leg without drift but with significant effort          Patient/Family was informed the Neurology Consult would happen via TeleHealth consult by way of interactive audio and video telecommunications and consented to receiving care in this manner.    Telehealth Neurology consultation was provided. I spent minutes providing telehealth care. This includes time spent for face to face visit via telemedicine, review of medical records, imaging  studies and discussion of findings with providers, the patient and/or family.      Dr Yesenia Bazan      [facility]  For Inpatient follow-up with [facility] physician please call [facility] [phone]. This is not an outpatient service. Post hospital discharge, please contact hospital directly.    Please do not communicate with [facility] physicians via secure chat. If you have any questions, Please contact [facility].  Please call or reconsult our service if there are any clinical or diagnostic changes.

## 2024-04-04 ENCOUNTER — READMISSION MANAGEMENT (OUTPATIENT)
Dept: CALL CENTER | Facility: HOSPITAL | Age: 44
End: 2024-04-04
Payer: COMMERCIAL

## 2024-04-04 VITALS
RESPIRATION RATE: 16 BRPM | TEMPERATURE: 98.8 F | WEIGHT: 199.3 LBS | HEIGHT: 65 IN | DIASTOLIC BLOOD PRESSURE: 83 MMHG | SYSTOLIC BLOOD PRESSURE: 128 MMHG | OXYGEN SATURATION: 97 % | HEART RATE: 64 BPM | BODY MASS INDEX: 33.2 KG/M2

## 2024-04-04 PROBLEM — R29.898 TRANSIENT WEAKNESS OF RIGHT LOWER EXTREMITY: Status: RESOLVED | Noted: 2024-04-02 | Resolved: 2024-04-04

## 2024-04-04 LAB
QT INTERVAL: 443 MS
QTC INTERVAL: 407 MS

## 2024-04-04 PROCEDURE — G0378 HOSPITAL OBSERVATION PER HR: HCPCS

## 2024-04-04 PROCEDURE — 99239 HOSP IP/OBS DSCHRG MGMT >30: CPT | Performed by: INTERNAL MEDICINE

## 2024-04-04 PROCEDURE — 99213 OFFICE O/P EST LOW 20 MIN: CPT | Performed by: PSYCHIATRY & NEUROLOGY

## 2024-04-04 RX ORDER — VENLAFAXINE HYDROCHLORIDE 37.5 MG/1
37.5 CAPSULE, EXTENDED RELEASE ORAL DAILY
Qty: 30 CAPSULE | Refills: 0 | Status: SHIPPED | OUTPATIENT
Start: 2024-04-04

## 2024-04-04 RX ADMIN — ACETAMINOPHEN 650 MG: 325 TABLET ORAL at 12:48

## 2024-04-04 RX ADMIN — Medication 10 ML: at 08:49

## 2024-04-04 RX ADMIN — VENLAFAXINE HYDROCHLORIDE 37.5 MG: 37.5 CAPSULE, EXTENDED RELEASE ORAL at 08:49

## 2024-04-04 RX ADMIN — ASPIRIN 81 MG: 81 TABLET, CHEWABLE ORAL at 08:49

## 2024-04-04 NOTE — DISCHARGE SUMMARY
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) and migraines   -likely atypical migraine, stroke ruled out  -CT head 4/2: neg  -CTA head and neck 4/2: head and neck vasculature with no hemodynamically sig stenoses  -Echo 4/3: EF 65%  -MRI brain 4/2: no acute or subacute infarct or hemorrhage  -EEG 4/3: normal with no epileptiform changes   -can d/c statin and ASA given no stroke  -PT/OT: rec STR?: pt denied the need for this or any DME   -tele-neuro followed: neuro follow up 4-6 weeks          Active Hospital Problems    Diagnosis  POA    **Transient weakness of right lower extremity [R29.898]  Yes      Resolved Hospital Problems   No resolved problems to display.         Hospital Course  Patient is a 43 y.o. female presented with ER with right sided weakness. Last night was in normal state of health, but awoke this am with headache with photophobia and then noticed that her right arm and leg were more weak thus she came to the ER. She has a h/o migraine, nonepileptic spells/conversion disorder manifesting as weakness and stuttering previously (per neurology records 3/12/2015).      Was treated and w/u was mostly unremarkable as noted above. She was felt stable to d/c home, but will be set up for either HH or outpt PT as she declined inpt STR, but also note she declined any home DME per .  She was improving and wanting to be d/c home and was felt stable to do so.         Procedures Performed:as noted           Consults:   Consults       Date and Time Order Name Status Description    4/2/2024 10:57 AM Hospitalist (on-call MD unless specified)                Pertinent  and/or Most Recent Results       Pertinent Test Results:     Results from last 7 days   Lab Units 04/02/24  0933   WBC 10*3/mm3 5.40   HEMOGLOBIN g/dL 10.1*   HEMATOCRIT % 33.3*   PLATELETS 10*3/mm3 337     Results from last 7 days   Lab Units 04/03/24  0436 04/02/24  0933   SODIUM mmol/L 139 138   POTASSIUM mmol/L 3.7 3.8   CHLORIDE mmol/L 105 104   CO2 mmol/L 24.1 23.7   BUN mg/dL 11 10   CREATININE mg/dL 0.85 0.98   CALCIUM mg/dL 7.9* 8.5*   BILIRUBIN mg/dL 0.2 0.2   ALK PHOS U/L 76 88   ALT (SGPT) U/L 9 11   AST (SGOT) U/L 13 16   GLUCOSE mg/dL 93 100*       Microbiology Results (last 10 days)       ** No results found for the last 240 hours. **            Results for orders placed during the hospital encounter of 04/02/24    Adult Transthoracic Echo Complete W/ Cont if Necessary Per Protocol (With Agitated Saline)    Interpretation Summary    Left ventricular systolic function is normal. Calculated left ventricular EF = 65.6%    Saline test results are negative for intracardiac shunting.    No hemodynamically significant valvular pathology.    Estimated right ventricular systolic pressure from tricuspid regurgitation is normal (<35 mmHg).    Left ventricular diastolic function was normal.      Imaging Results (All)       Procedure Component Value Units Date/Time    MRI Brain Without Contrast [[id number]] Collected: 04/02/24 2125     Updated: 04/02/24 2135    Narrative:      MRI BRAIN WO CONTRAST-     Date of Exam: 4/2/2024 9:07 PM     Indication: Transient ischemic attack (TIA); G81.90-Hemiplegia,  unspecified affecting unspecified side. Right arm and leg weakness.     Comparison: Head CT same day     Technique:  Routine multiplanar/multisequence sequence images of the  brain were obtained without contrast administration.     Findings:  Diffusion  images reveal no acute or subacute infarct. Ventricular size  and configuration are within normal limits. White matter signal is  homogeneous and normal. Major intracranial flow voids are maintained. No  acute hemorrhage. A densely calcified lesion in the right middle cranial  fossa corresponds to the findings on the earlier head CT, likely a  densely calcified meningioma. It measures approximately 2.1 x 1.3 x 1.8  cm. No associated vasogenic edema. No other masses are identified.  Pituitary gland is unremarkable. Craniovertebral junction is normal.  Mucous retention cyst is present in the right maxillary sinus.       Impression:         1. No evidence of acute or subacute infarct. No acute hemorrhage.  2. Normal homogeneous appearance of the white matter.  3. Densely calcified mass in the right middle cranial fossa is likely a  calcified meningioma.           Electronically Signed By-Dr. Erich Hameed MD On:4/2/2024 9:33 PM       CT Angiogram Head w AI Analysis of LVO [[id number]] Collected: 04/02/24 1056     Updated: 04/02/24 1122    Narrative:      CT ANGIOGRAM NECK-, CT ANGIOGRAM HEAD W AI ANALYSIS OF LVO-     Date of Exam: 4/2/2024 9:21 AM     Indication: Neuro deficit, acute stroke suspected.     Comparison: CT head from earlier today     Technique: CTA of the head and neck was performed before and after the  uneventful intravenous administration of 75 mL Isovue-370. Reconstructed  coronal and sagittal images were also obtained. In addition, a 3-D  volume rendered image was created for interpretation. Automated exposure  control and iterative reconstruction methods were used.        Findings:  There is a three-vessel arch. The right common carotid artery is widely  patent. The right carotid bifurcation is widely patent. The right  internal carotid artery is widely patent. The left common carotid artery  is widely patent. The left carotid bifurcation is widely patent. The  left internal carotid artery is  widely patent. Both vertebral arteries  are widely patent. The vertebral arteries are codominant.     The bilateral middle cerebral, bilateral anterior cerebral, and anterior  communicating arteries are widely patent. The vertebral and bilateral  posterior cerebral arteries are widely patent. There are patent  bilateral posterior communicating arteries. No intracranial aneurysm is  identified. The visualized portions of the bilateral superior  cerebellar, anterior inferior cerebellar, and posterior inferior  cerebellar arteries are unremarkable.       Impression:      Impression:  Major arterial vasculature within head and neck appears widely patent,  with no hemodynamically significant stenosis, dissection, thrombosis, or  aneurysm.           Electronically Signed By-Armando Segundo MD On:4/2/2024 11:20 AM       CT Angiogram Neck [[id number]] Collected: 04/02/24 1056     Updated: 04/02/24 1122    Narrative:      CT ANGIOGRAM NECK-, CT ANGIOGRAM HEAD W AI ANALYSIS OF LVO-     Date of Exam: 4/2/2024 9:21 AM     Indication: Neuro deficit, acute stroke suspected.     Comparison: CT head from earlier today     Technique: CTA of the head and neck was performed before and after the  uneventful intravenous administration of 75 mL Isovue-370. Reconstructed  coronal and sagittal images were also obtained. In addition, a 3-D  volume rendered image was created for interpretation. Automated exposure  control and iterative reconstruction methods were used.        Findings:  There is a three-vessel arch. The right common carotid artery is widely  patent. The right carotid bifurcation is widely patent. The right  internal carotid artery is widely patent. The left common carotid artery  is widely patent. The left carotid bifurcation is widely patent. The  left internal carotid artery is widely patent. Both vertebral arteries  are widely patent. The vertebral arteries are codominant.     The bilateral middle cerebral, bilateral  anterior cerebral, and anterior  communicating arteries are widely patent. The vertebral and bilateral  posterior cerebral arteries are widely patent. There are patent  bilateral posterior communicating arteries. No intracranial aneurysm is  identified. The visualized portions of the bilateral superior  cerebellar, anterior inferior cerebellar, and posterior inferior  cerebellar arteries are unremarkable.       Impression:      Impression:  Major arterial vasculature within head and neck appears widely patent,  with no hemodynamically significant stenosis, dissection, thrombosis, or  aneurysm.           Electronically Signed By-Armando Segundo MD On:4/2/2024 11:20 AM       XR Chest 1 View [[id number]] Collected: 04/02/24 1007     Updated: 04/02/24 1009    Narrative:      XR CHEST 1 VW-     Date of Exam: 4/2/2024 9:45 AM     Indication: Acute cerebrovascular accident, left-sided weakness     Comparison: None available.     Findings:  The heart size is normal. The pulmonary vascular markings are normal.  The lungs and pleural spaces are clear of active disease. The bony  thorax is normal for age.       Impression:      Impression:  No active disease.        Electronically Signed By-Barry Esquivel MD On:4/2/2024 10:07 AM       CT CEREBRAL PERFUSION WITH & WITHOUT CONTRAST [[id number]] Collected: 04/02/24 0946     Updated: 04/02/24 0949    Narrative:      EXAMINATION: CT CEREBRAL PERFUSION W WO CONTRAST-     DATE OF EXAM: 4/2/2024 9:20 AM     INDICATION: Neuro deficit, acute stroke suspected.     COMPARISON: CT same day     TECHNIQUE: Axial CT images of the brain were obtained prior to and after  the administration of 80 mL Isovue-370. CT Perfusion protocol was  utilized. Automated post processing was performed by RAPID software and  submitted to PACS for interpretation. Automated exposure control and  iterative reconstruction was utilized.      FINDINGS:   CT Perfusion:  CBF (<30%) volume: 0 mL  Tmax (>6.0s) volume: 0  mL  Mismatch volume: 0 mL  Mismatch ratio: None     The examination appears to be technically adequate. There is no reduced  cerebral blood volume (CBV) or reduced cerebral blood flow (CBF) to  suggest an area of acute infarction in a large vessel territory. The  cerebral perfusion appears symmetric. No increased mean transit time  (MTT) is seen. No areas of mismatch to suggest presence of a penumbra.       Impression:      1. No focal area of decreased cerebral blood flow (CBF) is seen to  suggest an acute infarct in a large vessel territory.  No defects are  seen to suggest a core infarct or an area of reversible ischemia.              Electronically Signed By-Cody Mello On:4/2/2024 9:47 AM       CT Head Without Contrast Stroke Protocol [[id number]] Collected: 04/02/24 0916     Updated: 04/02/24 0931    Narrative:      CT HEAD WO CONTRAST STROKE PROTOCOL-     Date of Exam: 4/2/2024 9:05 AM     Indication: Neuro deficit, acute stroke suspected.     Comparison: None available.     Technique: Routine transaxial and coronal reconstruction images were  obtained through the head without the administration of contrast.  Automated exposure control and iterative reconstruction methods were  used.        FINDINGS:  Brain/Ventricles: There is some mild motion limitation. No definite  acute intracranial hemorrhage noted. Within the right middle cranial  fossa there is a 2.2 x 1.4 cm extra-axial calcification likely  representing a calcified meningioma. The gray and white differentiation  is maintained. Small areas of low-attenuation within the deep white  matter and basal ganglia are nonspecific and may represent sequela small  vessel ischemic change.     Orbits: The visualized portion of the orbits demonstrate no acute  abnormality.     Sinuses:The visualized paranasal sinuses and mastoid air cells  demonstrate no acute abnormality.     Soft Tissues/Skull: No acute abnormality of the visualized skull or soft  tissues.        Impression:         1. No definite acute intracranial abnormality.     2. There is low-attenuation within the brain parenchyma are nonspecific  and could represent small vessel ischemic change or other etiologies  including demyelinating disease.     3. Probable meningioma right middle cranial fossa.     If clinically indicated MRI with contrast could always be considered on  a nonemergent basis to further evaluate, unless clinically indicated  sooner     Findings were discussed with Dr. Ladd at 9:18 a.m.        Electronically Signed By-Cody Mello On:4/2/2024 9:29 AM                 Day of Discharge       Condition on Discharge:  stable     Vital Signs  Temp:  [98.2 °F (36.8 °C)-99 °F (37.2 °C)] 98.6 °F (37 °C)  Heart Rate:  [61-72] 65  Resp:  [16-18] 17  BP: (119-133)/(78-97) 130/97    Physical Exam:  Physical Exam  Vitals reviewed.   Cardiovascular:      Rate and Rhythm: Normal rate.   Pulmonary:      Effort: No respiratory distress.   Abdominal:      General: There is no distension.   Neurological:      Mental Status: She is alert.      Comments: Gait no tested but RLE 4/5 strength           Discharge Disposition  Home-Health Care [facility] [place]    Discharge Medications     Discharge Medications        New Medications        Instructions Start Date   venlafaxine XR 37.5 MG 24 hr capsule  Commonly known as: EFFEXOR-XR   37.5 mg, Oral, Daily             Continue These Medications        Instructions Start Date   B-12 PO   1 tablet, Oral, Daily      COLLAGEN PO   1 tablet, Oral, Daily      CRANBERRY PO   1 tablet, Oral, Daily      multivitamin tablet tablet   1 tablet, Oral, Daily      multivitamin with minerals tablet tablet   1 tablet, Oral, Daily      VITAMIN D PO   1 tablet, Oral, Daily               Discharge Diet:   Diet Instructions       Diet: Cardiac Diets; Healthy Heart (2-3 Na+); Thin (IDDSI 0)      Discharge Diet: Cardiac Diets    Cardiac Diet: Healthy Heart (2-3 Na+)    Fluid Consistency: Thin (IDDSI  0)            Activity at Discharge:   Activity Instructions       Activity as Tolerated              Follow-up Appointments  No future appointments.  Additional Instructions for the Follow-ups that You Need to Schedule       Discharge Follow-up with PCP   As directed       Currently Documented PCP:    Provider, No Known    PCP Phone Number:    [phone]     Follow Up Details: one week                Test Results Pending at Discharge       Risk for Readmission (LACE) Score: 5 (4/4/2024  6:00 AM)        Time: Discharge 34 min with face-to-face history exam, writing of prescriptions, and documenting discharge data including care coordination with the nursing staff.      Electronically signed by Luis Solorzano DO, 04/04/24, 08:50 EDT.      Note disclaimer: At [facility], we believe that sharing information builds trust and better relationships. You are receiving this note because you recently visited [facility]. It is possible you will see health information before a provider has talked with you about it. This kind of information can be easy to misunderstand. To help you fully understand what it means for your health, we urge you to discuss this note with your provider.

## 2024-04-04 NOTE — OUTREACH NOTE
Prep Survey      Flowsheet Row Responses   [facility] facility patient discharged from? [place]   Is LACE score < 7 ? Yes   Eligibility Not Eligible   What are the reasons patient is not eligible? Other  [low risk for readmit]   Does the patient have one of the following disease processes/diagnoses(primary or secondary)? Other   Prep survey completed? Yes            [facility] - Registered Nurse

## 2024-04-04 NOTE — PROGRESS NOTES
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 Initial neuro exam noted as fluctuating and slow, at times resisting the movements intended during the exam and nonrhythmic left sided shaking noted c/f functional neurologic disorder. MRI Brain negative for acute findings. rEEG negative. Recommend:    For migraine prevention, discussed risks and benefit of Effexor 37.5 mg daily which patient is agreeable to. Would monitor BP with this  PT/OT  Outpatient f/up with neuro in 4-6 weeks  Please call with questions.    Our recommendations are outlined below    Consultations :  Physical therapy/Occupational therapy    Dispositions :  No further recommendationsOutpatient Neurology follow up in 3-6 weeks    Subjective  No acute events overnight.    Hospital Course  43 year old Female.  Patient was brought by private transportation with symptoms of right sided weakness and numbness.  The patient awoke with right sided weakness and numbness, she has a headache as well with photophobia and phonophobia. She has some nonrhythmic shaking of left upper extremity.  She denies recent stressors.  She has a h/o migraine, nonepileptic spells/conversion disorder manifesting as weakness and stuttering previously (per neurology records 3/12/2015)    HA occur up to 3 times/month, tend to be severe. Was previously on TPM but caused brain fog. Will also get lower grade headaches about once every week.    Imaging  MRI brain w/o cont:  1. No evidence of acute or subacute infarct. No  acute hemorrhage.  2. Normal homogeneous appearance of the white matter.  3. Densely calcified mass in the right middle cranial fossa is likely a  calcified meningioma.    CTA head/neckL  Major arterial vasculature within head and neck appears widely patent,  with no hemodynamically significant stenosis, dissection, thrombosis, or  aneurysm.    rEEG:  Normal EEG during the waking and light stages of sleep.  Hyperventilation and photic stimulation did not activate any abnormalities.  There were no epileptiform discharges noted today.        Examination  BP(123/84), Pulse(69), Temp(98.6), Resp(16),  1A: Level of Consciousness - Alert; keenly responsive + 0  1B: Ask Month and Age - Both Questions Right + 0  1C: Blink Eyes & Squeeze Hands - Performs Both Tasks + 0  2: Test Horizontal Extraocular Movements - Normal + 0  3: Test Visual Fields - No Visual Loss + 0  4: Test Facial Palsy (Use Grimace if Obtunded) - Normal symmetry + 0  5A: Test Left Arm Motor Drift - No Drift for 10 Seconds + 0  5B: Test Right Arm Motor Drift - No Drift for 10 Seconds + 0  6A: Test Left Leg Motor Drift - No Drift for 5 Seconds + 0  6B: Test Right Leg Motor Drift - No Drift for 5 Seconds + 0  7: Test Limb Ataxia (FNF/Heel-Shin) - No Ataxia + 0  8: Test Sensation - Mild-Moderate Loss: Can Sense Being Touched + 1  9: Test Language/Aphasia - Normal; No aphasia + 0  10: Test Dysarthria - Normal + 0  11: Test Extinction/Inattention - No abnormality + 0    NIHSS Score: 1  NIHSS Free Text : AAOx3 Tingling in right forearm, tickles when touch right leg  Able to lift right leg without drift but with significant effort          Patient/Family was informed the Neurology Consult would happen via TeleHealth consult by way of interactive audio and video telecommunications and consented to receiving care in this manner.    Telehealth Neurology consultation was provided. I spent minutes providing telehealth care. This includes time spent for face to face  visit via telemedicine, review of medical records, imaging studies and discussion of findings with providers, the patient and/or family.      Dr Yesenia Bazan      [facility]  For Inpatient follow-up with [facility] physician please call [facility] [phone]. This is not an outpatient service. Post hospital discharge, please contact hospital directly.    Please do not communicate with [facility] physicians via secure chat. If you have any questions, Please contact [facility].  Please call or reconsult our service if there are any clinical or diagnostic changes.

## 2024-04-04 NOTE — PLAN OF CARE
Goal Outcome Evaluation:  Plan of Care Reviewed With: patient        Progress: improving                                VSS. Pt has rested this shift. Some complaints of headache, medication helped. Plans to discharge home. Will continue to monitor

## 2024-04-04 NOTE — PLAN OF CARE
Problem: Adult Inpatient Plan of Care  Goal: Plan of Care Review  Outcome: Ongoing, Progressing  Goal: Patient-Specific Goal (Individualized)  Outcome: Ongoing, Progressing  Goal: Absence of Hospital-Acquired Illness or Injury  Outcome: Ongoing, Progressing  Intervention: Identify and Manage Fall Risk  Recent Flowsheet Documentation  Taken 4/3/2024 1954 by Susie Cervantes RN  Safety Promotion/Fall Prevention: safety round/check completed  Goal: Optimal Comfort and Wellbeing  Outcome: Ongoing, Progressing  Goal: Readiness for Transition of Care  Outcome: Ongoing, Progressing     Problem: Activity Intolerance  Goal: Enhanced Capacity and Energy  Outcome: Ongoing, Progressing     Problem: Adjustment to Illness (Stroke, Ischemic/Transient Ischemic Attack)  Goal: Optimal Coping  Outcome: Ongoing, Progressing     Problem: Bowel Elimination Impaired (Stroke, Ischemic/Transient Ischemic Attack)  Goal: Effective Bowel Elimination  Outcome: Ongoing, Progressing     Problem: Cerebral Tissue Perfusion (Stroke, Ischemic/Transient Ischemic Attack)  Goal: Optimal Cerebral Tissue Perfusion  Outcome: Ongoing, Progressing  Intervention: Protect and Optimize Cerebral Perfusion  Recent Flowsheet Documentation  Taken 4/3/2024 1954 by Susie Cervantes RN  Cerebral Perfusion Promotion: blood pressure monitored     Problem: Cognitive Impairment (Stroke, Ischemic/Transient Ischemic Attack)  Goal: Optimal Cognitive Function  Outcome: Ongoing, Progressing     Problem: Communication Impairment (Stroke, Ischemic/Transient Ischemic Attack)  Goal: Improved Communication Skills  Outcome: Ongoing, Progressing  Intervention: Optimize Communication Skills  Recent Flowsheet Documentation  Taken 4/3/2024 1954 by Susie Cervantes RN  Communication Enhancement Strategies:   call light answered in person   device use encouraged     Problem: Functional Ability Impaired (Stroke, Ischemic/Transient Ischemic Attack)  Goal: Optimal Functional Ability  Outcome:  Ongoing, Progressing     Problem: Respiratory Compromise (Stroke, Ischemic/Transient Ischemic Attack)  Goal: Effective Oxygenation and Ventilation  Outcome: Ongoing, Progressing     Problem: Sensorimotor Impairment (Stroke, Ischemic/Transient Ischemic Attack)  Goal: Improved Sensorimotor Function  Outcome: Ongoing, Progressing     Problem: Swallowing Impairment (Stroke, Ischemic/Transient Ischemic Attack)  Goal: Optimal Eating and Swallowing without Aspiration  Outcome: Ongoing, Progressing     Problem: Urinary Elimination Impaired (Stroke, Ischemic/Transient Ischemic Attack)  Goal: Effective Urinary Elimination  Outcome: Ongoing, Progressing   Goal Outcome Evaluation: